# Patient Record
Sex: MALE | Race: WHITE | NOT HISPANIC OR LATINO | Employment: OTHER | URBAN - METROPOLITAN AREA
[De-identification: names, ages, dates, MRNs, and addresses within clinical notes are randomized per-mention and may not be internally consistent; named-entity substitution may affect disease eponyms.]

---

## 2018-03-30 ENCOUNTER — OFFICE VISIT CONVERTED (OUTPATIENT)
Dept: CARDIOLOGY | Facility: CLINIC | Age: 58
End: 2018-03-30
Attending: SPECIALIST

## 2018-11-06 ENCOUNTER — CONVERSION ENCOUNTER (OUTPATIENT)
Dept: OTHER | Facility: HOSPITAL | Age: 58
End: 2018-11-06

## 2018-11-06 ENCOUNTER — OFFICE VISIT CONVERTED (OUTPATIENT)
Dept: CARDIOLOGY | Facility: CLINIC | Age: 58
End: 2018-11-06
Attending: SPECIALIST

## 2020-10-20 ENCOUNTER — OFFICE VISIT CONVERTED (OUTPATIENT)
Dept: CARDIOLOGY | Facility: CLINIC | Age: 60
End: 2020-10-20
Attending: SPECIALIST

## 2021-05-13 NOTE — PROGRESS NOTES
"   Progress Note      Patient Name: Marquis Pool   Patient ID: [id number]   Sex: Male   YOB: 1960    Primary Care Provider: No PCP No PCP Other   Referring Provider: Nikhil Corado MD    Visit Date: October 20, 2020    Provider: Nikhil Corado MD   Location: [facility]   Location Address: [address] [place] [address], [facility]   [place]  [id number]   Location Phone: [phone]          Chief Complaint  · Coronary artery disease, s/p PTCA/stent       History Of Present Illness  Marquis Pool is a 60 year old /White male with a history of coronary artery disease, s/p PTCA/stent. No chest pain or shortness of breath. He has not seen me for the last two years.   CURRENT MEDICATIONS: include Metoprolol 50 mg 1/2 tablet b.i.d.; ASA 81 mg 2 tablets daily; Rosuvastatin 20 mg daily; Prasugrel 10 mg daily; Amlodipine 5 mg daily; Omeprazole 20 mg daily; Vitamin C 500 mg daily. The dosage and frequency of the medications were reviewed with the patient.   PAST MEDICAL HISTORY: Coronary artery disease, s/p PTCA/stent; hypertension.   PSYCHOSOCIAL HISTORY: He rarely drinks alcohol. He smokes 1 pack of cigarettes per day.       Review of Systems  · Cardiovascular  o Admits  o : shortness of breath while walking or lying flat  o Denies  o : palpitations (fast, fluttering, or skipping beats), swelling (feet, ankles, hands), chest pain or angina pectoris   · Respiratory  o Admits  o : chronic or frequent cough      Vitals  Date Time BP Position Site L\R Cuff Size HR RR TEMP (F) WT  HT  BMI kg/m2 BSA m2 O2 Sat FR L/min FiO2        10/20/2020 09:37 /70 Sitting    54 - R   228lbs 0oz 5'  9\" 33.67 2.24             Physical Examination  · Constitutional  o Appearance  o : Awake, alert, cooperative, pleasant.  · Respiratory  o Inspection of Chest  o : No chest wall deformities, moving equal.  o Auscultation of Lungs  o : Good air entry with vesicular breath sounds.  · Cardiovascular  o Heart  o : "   § Auscultation of Heart  § : S1 and S2 regular. No S3. No S4. No murmurs.  o Peripheral Vascular System  o :   § Extremities  § : Peripheral pulses were well felt. No edema. No cyanosis.  · Gastrointestinal  o Abdominal Examination  o : No masses or organomegaly noted.          Assessment     ASSESSMENT AND PLAN:    1.  Coronary artery disease, s/p PTCA/stent, stable:  Continue ASA, Effient and Metoprolol.  2.  Essential hypertension controlled:  Continue Amlodipine.  3.  Hyperlipidemia:  Continue current dose of Rosuvastatin, managed by his PMD.  4.  Repeat stress test next year in view of his coronary artery disease, on his next visit.  5.  Positive for nicotine use:  Smoking-cessation instructions were discussed with the patient.  6.  See me back in 6 months.    Nikhil Corado MD, [facility]  DIANN/james           This note was transcribed by Ayesha Palumbo.  james/DIANN  The above service was transcribed by Ayesha Palumbo, and I attest to the accuracy of the note.  DIANN               Electronically Signed by: Ayesha Palumbo-, -Author on October 22, 2020 08:38:06 AM  Electronically Co-signed by: Nikhil Corado MD -Reviewer on October 26, 2020 08:36:40 AM

## 2021-05-14 VITALS
WEIGHT: 228 LBS | BODY MASS INDEX: 33.77 KG/M2 | HEART RATE: 54 BPM | SYSTOLIC BLOOD PRESSURE: 128 MMHG | HEIGHT: 69 IN | DIASTOLIC BLOOD PRESSURE: 70 MMHG

## 2021-05-16 VITALS
SYSTOLIC BLOOD PRESSURE: 134 MMHG | HEIGHT: 69 IN | WEIGHT: 239 LBS | BODY MASS INDEX: 35.4 KG/M2 | HEART RATE: 58 BPM | DIASTOLIC BLOOD PRESSURE: 92 MMHG

## 2021-05-16 VITALS
WEIGHT: 235 LBS | BODY MASS INDEX: 34.8 KG/M2 | DIASTOLIC BLOOD PRESSURE: 63 MMHG | SYSTOLIC BLOOD PRESSURE: 110 MMHG | HEART RATE: 60 BPM | HEIGHT: 69 IN

## 2021-05-22 ENCOUNTER — TRANSCRIBE ORDERS (OUTPATIENT)
Dept: CARDIOLOGY | Facility: CLINIC | Age: 61
End: 2021-05-22

## 2021-05-22 DIAGNOSIS — I25.10 CORONARY ARTERY DISEASE INVOLVING NATIVE CORONARY ARTERY, ANGINA PRESENCE UNSPECIFIED, UNSPECIFIED WHETHER NATIVE OR TRANSPLANTED HEART: Primary | ICD-10-CM

## 2021-07-20 ENCOUNTER — OFFICE VISIT (OUTPATIENT)
Dept: CARDIOLOGY | Facility: CLINIC | Age: 61
End: 2021-07-20

## 2021-07-20 VITALS
WEIGHT: 233 LBS | HEIGHT: 69 IN | HEART RATE: 66 BPM | SYSTOLIC BLOOD PRESSURE: 136 MMHG | BODY MASS INDEX: 34.51 KG/M2 | DIASTOLIC BLOOD PRESSURE: 72 MMHG

## 2021-07-20 DIAGNOSIS — Z72.0 NICOTINE USE: ICD-10-CM

## 2021-07-20 DIAGNOSIS — I25.10 CORONARY ARTERY DISEASE INVOLVING NATIVE CORONARY ARTERY OF NATIVE HEART WITHOUT ANGINA PECTORIS: Primary | ICD-10-CM

## 2021-07-20 DIAGNOSIS — E78.2 HYPERLIPEMIA, MIXED: ICD-10-CM

## 2021-07-20 DIAGNOSIS — Z95.5 HISTORY OF CORONARY ANGIOPLASTY WITH INSERTION OF STENT: ICD-10-CM

## 2021-07-20 PROCEDURE — 99214 OFFICE O/P EST MOD 30 MIN: CPT | Performed by: SPECIALIST

## 2021-07-20 RX ORDER — AMLODIPINE BESYLATE 5 MG/1
5 TABLET ORAL DAILY
Qty: 90 TABLET | Refills: 1 | Status: SHIPPED | OUTPATIENT
Start: 2021-07-20 | End: 2022-05-23

## 2021-07-20 RX ORDER — AMLODIPINE BESYLATE 5 MG/1
5 TABLET ORAL DAILY
COMMUNITY
End: 2021-07-20 | Stop reason: SDUPTHER

## 2021-07-20 RX ORDER — METOPROLOL TARTRATE 50 MG/1
50 TABLET, FILM COATED ORAL 2 TIMES DAILY
COMMUNITY
End: 2021-07-20 | Stop reason: SDUPTHER

## 2021-07-20 RX ORDER — PRASUGREL 10 MG/1
10 TABLET, FILM COATED ORAL DAILY
Qty: 90 TABLET | Refills: 3 | Status: SHIPPED | OUTPATIENT
Start: 2021-07-20 | End: 2022-08-31

## 2021-07-20 RX ORDER — ROSUVASTATIN CALCIUM 20 MG/1
20 TABLET, COATED ORAL NIGHTLY
COMMUNITY
End: 2022-12-08 | Stop reason: SDUPTHER

## 2021-07-20 RX ORDER — METOPROLOL TARTRATE 50 MG/1
TABLET, FILM COATED ORAL
Qty: 90 TABLET | Refills: 3 | Status: SHIPPED | OUTPATIENT
Start: 2021-07-20 | End: 2022-12-08 | Stop reason: SDUPTHER

## 2021-07-20 RX ORDER — PRASUGREL 10 MG/1
10 TABLET, FILM COATED ORAL DAILY
COMMUNITY
End: 2021-07-20 | Stop reason: SDUPTHER

## 2021-07-20 NOTE — PROGRESS NOTES
[facility]  Cardiology progress Note    Patient Name: Marquis Pool  : 1960    CHIEF COMPLAINT  Coronary artery disease, history of PTCA/stent.      Subjective   Subjective     HISTORY OF PRESENT ILLNESS    Marquis Pool is a 61 y.o. male with history of coronary disease, s/p PTCA/stent.  Denies any chest pain or shortness of breath.    Review of Systems:   Constitutional no fever,  no weight loss   Skin no rash   Otolaryngeal no difficulty swallowing   Cardiovascular See HPI   Pulmonary no cough, no sputum production   Gastrointestinal no constipation, no diarrhea   Genitourinary no dysuria, no hematuria   Hematologic no easy bruisability, no abnormal bleeding   Musculoskeletal no muscle pain   Neurologic no dizziness, no falls         Personal History     Social History:  reports that he has been smoking. He has never used smokeless tobacco. Drug use questions deferred to the physician. He reports that he does not drink alcohol.    Home Medications:  Current Outpatient Medications on File Prior to Visit   Medication Sig   • rosuvastatin (CRESTOR) 20 MG tablet Take 20 mg by mouth Every Night.   • [DISCONTINUED] amLODIPine (NORVASC) 5 MG tablet Take 5 mg by mouth Daily.   • [DISCONTINUED] metoprolol tartrate (LOPRESSOR) 50 MG tablet Take 50 mg by mouth 2 (Two) Times a Day.   • [DISCONTINUED] prasugrel (EFFIENT) 10 MG tablet Take 10 mg by mouth Daily.     No current facility-administered medications on file prior to visit.     Allergies:  No Known Allergies    Objective    Objective       Vitals:   Heart Rate:  [66-71] 66  BP: (136-155)/(72-91) 136/72  Body mass index is 34.41 kg/m².     Physical Exam:   Constitutional: Awake, alert, No acute distress    Eyes: PERRLA, sclerae anicteric, no conjunctival injection   HENT: NCAT, mucous membranes moist   Neck: Supple, no thyromegaly, no lymphadenopathy, trachea midline   Respiratory: Clear to auscultation bilaterally, nonlabored respirations     Cardiovascular: RRR, no murmurs, rubs, or gallops, palpable pedal pulses bilaterally   Gastrointestinal: Positive bowel sounds, soft, nontender, nondistended   Musculoskeletal: No bilateral ankle edema, no clubbing or cyanosis to extremities   Psychiatric: Appropriate affect, cooperative   Neurologic: Oriented x 3, strength symmetric in all extremities, Cranial Nerves grossly intact to confrontation, speech clear   Skin: No rashes.    Result Review    Result Review:  I have personally reviewed the available results from  [x]  Laboratory  [x]  EKG  [x]  Cardiology  [x]  Medications  [x]  Old records  []  Other:   Procedures      Impression/Plan:  1 Coronary disease s/p PTCA/stent: Exercise treadmill stress test done today was negative for any ischemia.  Continue aspirin, Effient and metoprolol.  2.  Hyperlipidemia: Continue Crestor 20 mg once a day.  Low-fat diet advised.  Lipid and hepatic profile next visit.  3.  Essential hypertension controlled: Continue amlodipine 5 mg once a day.  Low-salt diet advised.  4.  Positive for nicotine use: Smoking cessation discussed the patient.  5.  Obesity: Low-fat diet, regular exercise advised.        Electronically signed by Nikhil Corado MD, 07/20/21, 12:09 PM EDT.

## 2022-01-28 ENCOUNTER — TELEPHONE (OUTPATIENT)
Dept: CARDIOLOGY | Facility: CLINIC | Age: 62
End: 2022-01-28

## 2022-03-16 ENCOUNTER — APPOINTMENT (OUTPATIENT)
Dept: GENERAL RADIOLOGY | Facility: HOSPITAL | Age: 62
End: 2022-03-16

## 2022-03-16 ENCOUNTER — APPOINTMENT (OUTPATIENT)
Dept: MRI IMAGING | Facility: HOSPITAL | Age: 62
End: 2022-03-16

## 2022-03-16 ENCOUNTER — APPOINTMENT (OUTPATIENT)
Dept: CT IMAGING | Facility: HOSPITAL | Age: 62
End: 2022-03-16

## 2022-03-16 ENCOUNTER — HOSPITAL ENCOUNTER (EMERGENCY)
Facility: HOSPITAL | Age: 62
Discharge: HOME | End: 2022-03-16
Attending: EMERGENCY MEDICINE | Admitting: EMERGENCY MEDICINE

## 2022-03-16 VITALS
RESPIRATION RATE: 18 BRPM | DIASTOLIC BLOOD PRESSURE: 72 MMHG | OXYGEN SATURATION: 94 % | WEIGHT: 225.53 LBS | SYSTOLIC BLOOD PRESSURE: 130 MMHG | HEART RATE: 55 BPM | HEIGHT: 69 IN | TEMPERATURE: 98.1 F | BODY MASS INDEX: 33.4 KG/M2

## 2022-03-16 DIAGNOSIS — I63.9 CEREBROVASCULAR ACCIDENT (CVA), UNSPECIFIED MECHANISM: Primary | ICD-10-CM

## 2022-03-16 LAB
ABO GROUP BLD: NORMAL
ALBUMIN SERPL-MCNC: 4 G/DL (ref 3.5–5.2)
ALBUMIN/GLOB SERPL: 1.4 G/DL
ALP SERPL-CCNC: 90 U/L (ref 39–117)
ALT SERPL W P-5'-P-CCNC: 15 U/L (ref 1–41)
ANION GAP SERPL CALCULATED.3IONS-SCNC: 11.1 MMOL/L (ref 5–15)
AST SERPL-CCNC: 13 U/L (ref 1–40)
BASOPHILS # BLD AUTO: 0.08 10*3/MM3 (ref 0–0.2)
BASOPHILS NFR BLD AUTO: 0.8 % (ref 0–1.5)
BILIRUB SERPL-MCNC: 0.6 MG/DL (ref 0–1.2)
BLD GP AB SCN SERPL QL: NEGATIVE
BUN SERPL-MCNC: 12 MG/DL (ref 8–23)
BUN/CREAT SERPL: 12.8 (ref 7–25)
CALCIUM SPEC-SCNC: 8.8 MG/DL (ref 8.6–10.5)
CHLORIDE SERPL-SCNC: 106 MMOL/L (ref 98–107)
CO2 SERPL-SCNC: 23.9 MMOL/L (ref 22–29)
CREAT SERPL-MCNC: 0.94 MG/DL (ref 0.76–1.27)
DEPRECATED RDW RBC AUTO: 47.1 FL (ref 37–54)
EGFRCR SERPLBLD CKD-EPI 2021: 91.1 ML/MIN/1.73
EOSINOPHIL # BLD AUTO: 0.46 10*3/MM3 (ref 0–0.4)
EOSINOPHIL NFR BLD AUTO: 4.6 % (ref 0.3–6.2)
ERYTHROCYTE [DISTWIDTH] IN BLOOD BY AUTOMATED COUNT: 14 % (ref 12.3–15.4)
GLOBULIN UR ELPH-MCNC: 2.8 GM/DL
GLUCOSE SERPL-MCNC: 126 MG/DL (ref 65–99)
HCT VFR BLD AUTO: 43.8 % (ref 37.5–51)
HGB BLD-MCNC: 14.7 G/DL (ref 13–17.7)
HOLD SPECIMEN: NORMAL
HOLD SPECIMEN: NORMAL
IMM GRANULOCYTES # BLD AUTO: 0.02 10*3/MM3 (ref 0–0.05)
IMM GRANULOCYTES NFR BLD AUTO: 0.2 % (ref 0–0.5)
INR PPP: 0.95 (ref 2–3)
LYMPHOCYTES # BLD AUTO: 1.9 10*3/MM3 (ref 0.7–3.1)
LYMPHOCYTES NFR BLD AUTO: 19.1 % (ref 19.6–45.3)
MCH RBC QN AUTO: 30.6 PG (ref 26.6–33)
MCHC RBC AUTO-ENTMCNC: 33.6 G/DL (ref 31.5–35.7)
MCV RBC AUTO: 91.3 FL (ref 79–97)
MONOCYTES # BLD AUTO: 0.78 10*3/MM3 (ref 0.1–0.9)
MONOCYTES NFR BLD AUTO: 7.8 % (ref 5–12)
NEUTROPHILS NFR BLD AUTO: 6.73 10*3/MM3 (ref 1.7–7)
NEUTROPHILS NFR BLD AUTO: 67.5 % (ref 42.7–76)
NRBC BLD AUTO-RTO: 0 /100 WBC (ref 0–0.2)
PLATELET # BLD AUTO: 328 10*3/MM3 (ref 140–450)
PMV BLD AUTO: 10.4 FL (ref 6–12)
POTASSIUM SERPL-SCNC: 3.9 MMOL/L (ref 3.5–5.2)
PROT SERPL-MCNC: 6.8 G/DL (ref 6–8.5)
PROTHROMBIN TIME: 10.1 SECONDS (ref 9.4–12)
QT INTERVAL: 425 MS
RBC # BLD AUTO: 4.8 10*6/MM3 (ref 4.14–5.8)
RH BLD: POSITIVE
SODIUM SERPL-SCNC: 141 MMOL/L (ref 136–145)
T&S EXPIRATION DATE: NORMAL
WBC NRBC COR # BLD: 9.97 10*3/MM3 (ref 3.4–10.8)
WHOLE BLOOD HOLD SPECIMEN: NORMAL
WHOLE BLOOD HOLD SPECIMEN: NORMAL

## 2022-03-16 PROCEDURE — 36415 COLL VENOUS BLD VENIPUNCTURE: CPT

## 2022-03-16 PROCEDURE — 70553 MRI BRAIN STEM W/O & W/DYE: CPT

## 2022-03-16 PROCEDURE — A9577 INJ MULTIHANCE: HCPCS | Performed by: EMERGENCY MEDICINE

## 2022-03-16 PROCEDURE — 71045 X-RAY EXAM CHEST 1 VIEW: CPT

## 2022-03-16 PROCEDURE — 85025 COMPLETE CBC W/AUTO DIFF WBC: CPT | Performed by: EMERGENCY MEDICINE

## 2022-03-16 PROCEDURE — 93005 ELECTROCARDIOGRAM TRACING: CPT

## 2022-03-16 PROCEDURE — 96374 THER/PROPH/DIAG INJ IV PUSH: CPT

## 2022-03-16 PROCEDURE — 70548 MR ANGIOGRAPHY NECK W/DYE: CPT

## 2022-03-16 PROCEDURE — 70450 CT HEAD/BRAIN W/O DYE: CPT

## 2022-03-16 PROCEDURE — 85610 PROTHROMBIN TIME: CPT | Performed by: EMERGENCY MEDICINE

## 2022-03-16 PROCEDURE — 86900 BLOOD TYPING SEROLOGIC ABO: CPT | Performed by: EMERGENCY MEDICINE

## 2022-03-16 PROCEDURE — 86901 BLOOD TYPING SEROLOGIC RH(D): CPT | Performed by: EMERGENCY MEDICINE

## 2022-03-16 PROCEDURE — 99283 EMERGENCY DEPT VISIT LOW MDM: CPT

## 2022-03-16 PROCEDURE — 25010000002 LORAZEPAM PER 2 MG: Performed by: EMERGENCY MEDICINE

## 2022-03-16 PROCEDURE — 86850 RBC ANTIBODY SCREEN: CPT | Performed by: EMERGENCY MEDICINE

## 2022-03-16 PROCEDURE — 0 GADOBENATE DIMEGLUMINE 529 MG/ML SOLUTION: Performed by: EMERGENCY MEDICINE

## 2022-03-16 PROCEDURE — 93005 ELECTROCARDIOGRAM TRACING: CPT | Performed by: EMERGENCY MEDICINE

## 2022-03-16 PROCEDURE — 70544 MR ANGIOGRAPHY HEAD W/O DYE: CPT

## 2022-03-16 PROCEDURE — 80053 COMPREHEN METABOLIC PANEL: CPT | Performed by: EMERGENCY MEDICINE

## 2022-03-16 RX ORDER — SODIUM CHLORIDE 0.9 % (FLUSH) 0.9 %
10 SYRINGE (ML) INJECTION AS NEEDED
Status: DISCONTINUED | OUTPATIENT
Start: 2022-03-16 | End: 2022-03-16

## 2022-03-16 RX ORDER — SODIUM CHLORIDE 0.9 % (FLUSH) 0.9 %
10 SYRINGE (ML) INJECTION AS NEEDED
Status: DISCONTINUED | OUTPATIENT
Start: 2022-03-16 | End: 2022-03-16 | Stop reason: HOSPADM

## 2022-03-16 RX ORDER — CLOPIDOGREL BISULFATE 75 MG/1
75 TABLET ORAL DAILY
Qty: 30 TABLET | Refills: 0 | Status: SHIPPED | OUTPATIENT
Start: 2022-03-16 | End: 2022-12-13

## 2022-03-16 RX ORDER — LORAZEPAM 2 MG/ML
1 INJECTION INTRAMUSCULAR ONCE
Status: COMPLETED | OUTPATIENT
Start: 2022-03-16 | End: 2022-03-16

## 2022-03-16 RX ADMIN — LORAZEPAM 1 MG: 2 INJECTION INTRAMUSCULAR; INTRAVENOUS at 16:20

## 2022-03-16 RX ADMIN — GADOBENATE DIMEGLUMINE 20 ML: 529 INJECTION, SOLUTION INTRAVENOUS at 17:35

## 2022-03-16 NOTE — ED PROVIDER NOTES
Time: 1:08 PM EDT  Arrived by: private car; accompanied by spouse   Chief Complaint: WEAKNESS and DIFFICULTY WITH SPEECH   History provided by: pt  History is limited by: N/A     History of Present Illness:  Patient is a 62 y.o. year old male that presents to the emergency department with WEAKNESS and DIFFICULTY WITH SPEECH. This started two days ago and is still present, but improved. Symptoms have been constant and are moderate in severity. Nothing improves or worsens the symptoms. The weakness is located to the RUE and the difficulty with speech is described as slurred speech. Pt has not been evaluated for symptoms prior to today's visit. He denies change in vision. No weakness to the RLE.     Pt has hx of TIA and MI. Pt takes 81 mg of aspirin daily.     History provided by:  Patient  Cerebrovascular Accident  The primary symptoms include focal weakness (RUE) and speech change. Primary symptoms do not include headaches, visual change, fever, nausea or vomiting. Loss of sensation: slurred speech. The symptoms began 2 days ago.   The symptoms are improving.   Weakness began greater than 24 hours ago. The weakness is improving.   Additional symptoms include weakness (RUE). Additional symptoms do not include photophobia.     Similar Symptoms Previously: no  Recently seen: not recently seen in this ED     Patient Care Team  Primary Care Provider: Provider, No Known  Cardiologist - [place]     Past Medical History:     No Known Allergies  Past Medical History:   Diagnosis Date   • Coronary artery disease    • Hypertension    • Myocardial infarction (HCC)    • TIA (transient ischemic attack)      Past Surgical History:   Procedure Laterality Date   • CAROTID STENT       Family History   Problem Relation Age of Onset   • Heart attack Mother        Home Medications:  Prior to Admission medications    Medication Sig Start Date End Date Taking? Authorizing Provider   amLODIPine (NORVASC) 5 MG tablet Take 1 tablet by mouth  "Daily. 7/20/21   Nikhil Corado MD   metoprolol tartrate (LOPRESSOR) 50 MG tablet Take 1/2 tablet by mouth twice daily. 7/20/21   Nikhil Corado MD   prasugrel (EFFIENT) 10 MG tablet Take 1 tablet by mouth Daily. 7/20/21   Nikhil Corado MD   rosuvastatin (CRESTOR) 20 MG tablet Take 20 mg by mouth Every Night.    Provider, Alesia, MD        Social History:   Social History     Tobacco Use   • Smoking status: Current Every Day Smoker   • Smokeless tobacco: Never Used   Vaping Use   • Vaping Use: Never used   Substance Use Topics   • Alcohol use: Never   • Drug use: Defer       Review of Systems:  Review of Systems   Constitutional: Negative for chills, diaphoresis and fever.   HENT: Negative for ear discharge and nosebleeds.    Eyes: Negative for photophobia.   Respiratory: Negative for shortness of breath.    Cardiovascular: Negative for chest pain.   Gastrointestinal: Negative for diarrhea, nausea and vomiting.   Genitourinary: Negative for dysuria.   Musculoskeletal: Negative for back pain and neck pain.   Skin: Negative for rash.   Neurological: Positive for speech change, focal weakness (RUE), speech difficulty and weakness (RUE). Negative for headaches.        Physical Exam:  /72   Pulse 55   Temp 98.1 °F (36.7 °C) (Oral)   Resp 18   Ht 175.3 cm (69\")   Wt 102 kg (225 lb 8.5 oz)   SpO2 94%   BMI 33.31 kg/m²     Physical Exam  Vitals and nursing note reviewed.   Constitutional:       General: He is not in acute distress.     Appearance: Normal appearance.   HENT:      Head: Normocephalic and atraumatic.      Nose: Nose normal.   Eyes:      General: No scleral icterus.  Cardiovascular:      Rate and Rhythm: Normal rate and regular rhythm.      Heart sounds: Normal heart sounds.   Pulmonary:      Effort: Pulmonary effort is normal. No respiratory distress.      Breath sounds: Normal breath sounds.   Abdominal:      Palpations: Abdomen is soft.      Tenderness: There is no " abdominal tenderness.   Musculoskeletal:         General: Normal range of motion.      Cervical back: Neck supple.      Right lower leg: No edema.      Left lower leg: No edema.   Skin:     General: Skin is warm and dry.   Neurological:      General: No focal deficit present.      Mental Status: He is alert and oriented to person, place, and time.      Cranial Nerves: Dysarthria present. No facial asymmetry.      Sensory: Sensory deficit (numbness to rigth arm) present.      Motor: Weakness (right arm) present.      Comments: NIH Stroke Scale: 3                 Medications in the Emergency Department:  Medications   LORazepam (ATIVAN) injection 1 mg (1 mg Intravenous Given 3/16/22 1620)   gadobenate dimeglumine (MULTIHANCE) injection 20 mL (20 mL Intravenous Given 3/16/22 3621)        Labs  Lab Results (last 24 hours)     ** No results found for the last 24 hours. **           Imaging:  No Radiology Exams Resulted Within Past 24 Hours    Procedures:  Procedures    Progress                            Medical Decision Making:  MDM   62-year-old male patient presents 2 days after having onset of slurred speech and right upper extremity weakness.  His symptoms have slightly improved since initial onset.  Patient is outside the window is for any interventional treatment.  Initial head CT did not show acute findings.  Subsequent MRI angiogram of the head and neck and MRI brain with and without contrast did not show any acute findings.  Patient was discussed with the on-call neurologist,, Dr. Patel, who feels the patient can safely be discharged home with office follow-up.  The patient is stable for discharge with outpatient neurology follow-up.  Final diagnoses:   Cerebrovascular accident (CVA), unspecified mechanism (HCC)        Disposition:  ED Disposition     ED Disposition   Discharge    Condition   Stable    Comment   --             Documentation assistance provided by Tammy Aguilar acting as scribe for Faraz  DO Lissa. Information recorded by the scribe was done at my direction and has been verified and validated by me.         Tammy Aguilar  03/16/22 1315       Faraz Alcaraz DO  03/20/22 1949

## 2022-03-17 ENCOUNTER — TELEPHONE (OUTPATIENT)
Dept: CARDIOLOGY | Facility: CLINIC | Age: 62
End: 2022-03-17

## 2022-03-17 NOTE — TELEPHONE ENCOUNTER
Received VM from wife stating patient was seen in ER for possible strokes and was started on plavix. Wife wanting to know if it was ok to take. Advised can take plavix.

## 2022-03-21 PROBLEM — Z98.61 CAD S/P PERCUTANEOUS CORONARY ANGIOPLASTY: Status: ACTIVE | Noted: 2022-03-21

## 2022-03-21 PROBLEM — E78.5 HYPERLIPIDEMIA LDL GOAL <70: Status: ACTIVE | Noted: 2022-03-21

## 2022-03-21 PROBLEM — I25.10 CAD S/P PERCUTANEOUS CORONARY ANGIOPLASTY: Status: ACTIVE | Noted: 2022-03-21

## 2022-03-21 PROBLEM — I63.9 CVA (CEREBROVASCULAR ACCIDENT): Status: ACTIVE | Noted: 2022-03-21

## 2022-03-21 PROBLEM — I10 ESSENTIAL HYPERTENSION: Status: ACTIVE | Noted: 2022-03-21

## 2022-03-21 PROBLEM — F17.200 SMOKER: Status: ACTIVE | Noted: 2022-03-21

## 2022-03-22 ENCOUNTER — OFFICE VISIT (OUTPATIENT)
Dept: CARDIOLOGY | Facility: CLINIC | Age: 62
End: 2022-03-22

## 2022-03-22 VITALS
OXYGEN SATURATION: 98 % | SYSTOLIC BLOOD PRESSURE: 128 MMHG | HEIGHT: 69 IN | BODY MASS INDEX: 32.67 KG/M2 | WEIGHT: 220.6 LBS | HEART RATE: 103 BPM | DIASTOLIC BLOOD PRESSURE: 86 MMHG

## 2022-03-22 DIAGNOSIS — I63.9 CEREBROVASCULAR ACCIDENT (CVA), UNSPECIFIED MECHANISM: Primary | ICD-10-CM

## 2022-03-22 DIAGNOSIS — F17.200 SMOKER: ICD-10-CM

## 2022-03-22 DIAGNOSIS — I65.21 CAROTID STENOSIS, ASYMPTOMATIC, RIGHT: ICD-10-CM

## 2022-03-22 PROCEDURE — 99214 OFFICE O/P EST MOD 30 MIN: CPT | Performed by: NURSE PRACTITIONER

## 2022-03-22 RX ORDER — BUPROPION HYDROCHLORIDE 150 MG/1
150 TABLET, EXTENDED RELEASE ORAL 2 TIMES DAILY
Qty: 180 TABLET | Refills: 1 | Status: SHIPPED | OUTPATIENT
Start: 2022-03-22 | End: 2022-12-13

## 2022-03-22 RX ORDER — ASPIRIN 81 MG/1
81 TABLET, CHEWABLE ORAL DAILY
COMMUNITY
End: 2022-12-13 | Stop reason: SDUPTHER

## 2022-03-22 NOTE — PROGRESS NOTES
Chief Complaint  Follow-up ([facility] 3/16 - EKG in chart )    Subjective            History of Present Illness  Marquis Pool is a 62-year-old white/ male patient who presents to the office today for ER follow-up.  He presented to [facility] emergency room on 3/16/2022 for symptoms of weakness and difficulty with speech.  He was diagnosed with CVA, was administered Ativan and had CT head, MRI brain, MRA brain, and MRA of neck performed.  MRA of neck did show mild stenosis of right carotid.  There were no acute findings, neurology was consulted, and patient was discharged to home with orders to follow-up outpatient.  He was prescribed 30 days worth of Plavix 75 mg daily upon discharge from the ER.  Today he reports that he still has occasional tremor of right upper extremity and intermittent limited range of motion with the right upper extremity as well.  He reports compliance with all of his medications.  He denies any chest pain, shortness of breath, lightheadedness/dizziness, palpitations, or edema.    [facility]  Past Medical History:   Diagnosis Date   • CAD S/P percutaneous coronary angioplasty 03/21/2022   • CVA 03/21/2022   • Essential hypertension 03/21/2022   • Hyperlipidemia LDL goal <70 03/21/2022   • Myocardial infarction (HCC)    • Smoker 03/21/2022   • TIA (transient ischemic attack)          ALLERGY  No Known Allergies       SURGICALHX  Past Surgical History:   Procedure Laterality Date   • CAROTID STENT            SOC  Social History     Socioeconomic History   • Marital status:    Tobacco Use   • Smoking status: Current Every Day Smoker   • Smokeless tobacco: Never Used   Vaping Use   • Vaping Use: Never used   Substance and Sexual Activity   • Alcohol use: Never   • Drug use: Defer   • Sexual activity: Defer         FAMHX  Family History   Problem Relation Age of Onset   • Heart attack Mother           MEDSIGONLY  Current Outpatient Medications on File Prior to Visit   Medication Sig  "  • amLODIPine (NORVASC) 5 MG tablet Take 1 tablet by mouth Daily.   • aspirin 81 MG chewable tablet Chew 81 mg Daily.   • clopidogrel (Plavix) 75 MG tablet Take 1 tablet by mouth Daily.   • metoprolol tartrate (LOPRESSOR) 50 MG tablet Take 1/2 tablet by mouth twice daily.   • prasugrel (EFFIENT) 10 MG tablet Take 1 tablet by mouth Daily.   • rosuvastatin (CRESTOR) 20 MG tablet Take 20 mg by mouth Every Night.     No current facility-administered medications on file prior to visit.         Objective   /86   Pulse 103   Ht 175.3 cm (69\")   Wt 100 kg (220 lb 9.6 oz)   SpO2 98%   BMI 32.58 kg/m²       Physical Exam  Constitutional:       Appearance: He is obese.   HENT:      Head: Normocephalic.   Cardiovascular:      Rate and Rhythm: Normal rate and regular rhythm.      Pulses: Normal pulses.      Heart sounds: Normal heart sounds. No murmur heard.  Pulmonary:      Effort: Pulmonary effort is normal.      Breath sounds: Normal breath sounds.   Musculoskeletal:      Cervical back: Neck supple.      Right lower leg: No edema.      Left lower leg: No edema.   Skin:     General: Skin is dry.      Capillary Refill: Capillary refill takes less than 2 seconds.   Neurological:      Mental Status: He is alert and oriented to person, place, and time.   Psychiatric:         Behavior: Behavior normal.       Result Review :   The following data was reviewed by: LONDON Montero on 03/22/2022:  No results found for: PROBNP  CMP    CMP 3/16/22   Glucose 126 (A)   BUN 12   Creatinine 0.94   Sodium 141   Potassium 3.9   Chloride 106   Calcium 8.8   Albumin 4.00   Total Bilirubin 0.6   Alkaline Phosphatase 90   AST (SGOT) 13   ALT (SGPT) 15   (A) Abnormal value            CBC w/diff    CBC w/Diff 3/16/22   WBC 9.97   RBC 4.80   Hemoglobin 14.7   Hematocrit 43.8   MCV 91.3   MCH 30.6   MCHC 33.6   RDW 14.0   Platelets 328   Neutrophil Rel % 67.5   Immature Granulocyte Rel % 0.2   Lymphocyte Rel % 19.1 (A)   Monocyte Rel " % 7.8   Eosinophil Rel % 4.6   Basophil Rel % 0.8   (A) Abnormal value             No results found for: TSH   No results found for: FREET4   No results found for: DDIMERQUANT  No results found for: MG   No results found for: DIGOXIN   No results found for: TROPONINT               Assessment and Plan    Diagnoses and all orders for this visit:    1. Cerebrovascular accident (CVA) (Primary)  Patient reports that he has not made follow-up with neurology at this time but is willing to see Dr. Suarez for consultation.  He has been taking the Plavix 75 mg daily.  I had discussed this with Dr. Corado since the patient is already taking Effient and aspirin.  It was recommended by Dr. Corado to stop the Plavix due to increased risk of bleed.    -     Ambulatory Referral to Neurology    2. Carotid stenosis, right  MRA of neck shows mild right carotid stenosis, will obtain CTA carotid for further evaluation.  -     CT Angiogram Carotids; Future    3. Smoker  Tobacco abuse cessation counseling provided to patient today and was able to get patient to commit to cessation plan with aid of Wellbutrin.    Other orders  -     buPROPion SR (Wellbutrin SR) 150 MG 12 hr tablet; Take 1 tablet by mouth 2 (Two) Times a Day.  Dispense: 180 tablet; Refill: 1            Follow Up   Return in about 6 months (around 9/22/2022) for Follow up with Dr Corado.    Patient was given instructions and counseling regarding his condition or for health maintenance advice. Please see specific information pulled into the AVS if appropriate.     Marquis Pool  reports that he has been smoking. He has never used smokeless tobacco.. I have educated him on the risk of diseases from using tobacco products such as cancer, COPD and heart disease.     I advised him to quit and he is willing to quit. We have discussed the following method/s for tobacco cessation:  Prescription Medicaiton: Wellbutrin.  Together we have set a quit date for 2 months.  He will  follow up with me in 6 months  or sooner to check on his progress.    I spent 5 minutes counseling the patient.         Kelley Hopper, APRN  03/22/22  14:53 EDT    Dictated Utilizing Dragon Dictation

## 2022-04-06 ENCOUNTER — TELEPHONE (OUTPATIENT)
Dept: CARDIOLOGY | Facility: CLINIC | Age: 62
End: 2022-04-06

## 2022-04-06 DIAGNOSIS — I63.9 CEREBROVASCULAR ACCIDENT (CVA), UNSPECIFIED MECHANISM: Primary | ICD-10-CM

## 2022-04-06 NOTE — TELEPHONE ENCOUNTER
Spoke with Dr Suarez: He is wanting 24 hour holter monitor and echocardiogram to be done in our office and results faxed to him. Please call his office for orders to be faxed to us.

## 2022-04-06 NOTE — TELEPHONE ENCOUNTER
Received call from Dr Suarez's office. Dr Suarez wanting patient to have stress test and echo.     Please advise

## 2022-04-15 ENCOUNTER — HOSPITAL ENCOUNTER (OUTPATIENT)
Dept: CT IMAGING | Facility: HOSPITAL | Age: 62
Discharge: HOME | End: 2022-04-15
Admitting: NURSE PRACTITIONER

## 2022-04-15 DIAGNOSIS — I65.21 CAROTID STENOSIS, ASYMPTOMATIC, RIGHT: ICD-10-CM

## 2022-04-15 PROCEDURE — 0 IOPAMIDOL PER 1 ML: Performed by: NURSE PRACTITIONER

## 2022-04-15 PROCEDURE — 70498 CT ANGIOGRAPHY NECK: CPT

## 2022-04-15 RX ADMIN — IOPAMIDOL 100 ML: 755 INJECTION, SOLUTION INTRAVENOUS at 15:40

## 2022-04-18 ENCOUNTER — TELEPHONE (OUTPATIENT)
Dept: CARDIOLOGY | Facility: CLINIC | Age: 62
End: 2022-04-18

## 2022-04-18 DIAGNOSIS — R91.1 PULMONARY NODULE: Primary | ICD-10-CM

## 2022-04-18 NOTE — TELEPHONE ENCOUNTER
----- Message from LONDON Posey sent at 4/18/2022 12:44 AM EDT -----  Notify pt plaquing is seen bilaterally in the carotids but stenosis is less than 25%. It appears patient also has some emphysema and 6 mm right upper lobe pulmonary nodule. There is no PCP listed for the patient. We can refer him to pulmonology if he needs us to.

## 2022-04-18 NOTE — TELEPHONE ENCOUNTER
SW patient regarding results and recommendations. Patient does not have PCP. Patient agreeable for pulmonary referral. Referral placed

## 2022-05-10 ENCOUNTER — TELEPHONE (OUTPATIENT)
Dept: CARDIOLOGY | Facility: CLINIC | Age: 62
End: 2022-05-10

## 2022-05-10 NOTE — TELEPHONE ENCOUNTER
----- Message from LONDON Posey sent at 5/10/2022 11:03 AM EDT -----  Notify pt holter result: Maximum heart rate was 82.  Minimal heart rate 44.  Average heart rate 54.  There are frequent PACs (11% of all beats) extra beats in bottom of heart, can be felt as palpitations, continue current dose of metoprolol. He can take extra dose for increased heart rate or palpitations.  There is no significant tachy or bradycardia arrhythmias. Continue with October follow up as scheduled

## 2022-05-19 ENCOUNTER — OFFICE VISIT (OUTPATIENT)
Dept: PULMONOLOGY | Facility: CLINIC | Age: 62
End: 2022-05-19

## 2022-05-19 VITALS
HEART RATE: 53 BPM | HEIGHT: 70 IN | WEIGHT: 228 LBS | OXYGEN SATURATION: 95 % | DIASTOLIC BLOOD PRESSURE: 69 MMHG | BODY MASS INDEX: 32.64 KG/M2 | TEMPERATURE: 98.6 F | RESPIRATION RATE: 19 BRPM | SYSTOLIC BLOOD PRESSURE: 119 MMHG

## 2022-05-19 DIAGNOSIS — K21.9 GERD WITHOUT ESOPHAGITIS: ICD-10-CM

## 2022-05-19 DIAGNOSIS — G47.33 OSA (OBSTRUCTIVE SLEEP APNEA): ICD-10-CM

## 2022-05-19 DIAGNOSIS — R93.89 ABNORMAL CHEST CT: ICD-10-CM

## 2022-05-19 DIAGNOSIS — I25.10 CORONARY ARTERY DISEASE INVOLVING NATIVE HEART WITHOUT ANGINA PECTORIS, UNSPECIFIED VESSEL OR LESION TYPE: ICD-10-CM

## 2022-05-19 DIAGNOSIS — I67.9 CEREBROVASCULAR DISEASE: ICD-10-CM

## 2022-05-19 DIAGNOSIS — Z71.6 ENCOUNTER FOR SMOKING CESSATION COUNSELING: ICD-10-CM

## 2022-05-19 DIAGNOSIS — J44.9 CHRONIC OBSTRUCTIVE PULMONARY DISEASE, UNSPECIFIED COPD TYPE: Primary | ICD-10-CM

## 2022-05-19 PROCEDURE — 99204 OFFICE O/P NEW MOD 45 MIN: CPT | Performed by: INTERNAL MEDICINE

## 2022-05-19 PROCEDURE — 94618 PULMONARY STRESS TESTING: CPT | Performed by: INTERNAL MEDICINE

## 2022-05-19 NOTE — PROGRESS NOTES
Primary Care Provider  Provider, No Known   Referring Provider  RODERICK Posey*      Patient Complaint  Establish Care, 6mm Lung Nodule , and Cough      Subjective          Marquis Pool presents to [facility] PULMONARY & CRITICAL CARE MEDICINE      History of Presenting Illness  Marquis Pool is a 62 y.o. male   retired newspaper and director with COPD, coronary artery disease, snoring, GERD and a history of TIA who presents for evaluation of an abnormal chest CT.    2010: Acute large MI with subsequent coronary stents x6 last stent 2012    3/16/2022: TIA/RIND with acute right arm weakness and speech changes evaluated in ER and released    3/22/2022: Cardiology consultation with recommendation to stop smoking and see neurology    4/15/2022: CT carotid angiogram incidental finding right upper lobe 6 mm nodule and 1.9 cm right hilar node and subcarinal adenopathy and pulmonary consultation requested    5/19/2022: Dyspnea with exertion mild walking 2 flights of stairs or approximately 100 yards.  Limited more by knee pain than dyspnea per se.  Occasional rare cough but no productive phlegm.  Wife reports wheezing with exertion intermittently but no chest tightness.  Occasional edema at the end of the day but quite minimal.  No orthopnea and no paroxysmal nocturnal dyspnea.  No angina.    Smoking still up to 2 packs/day  chantix 2012 tried for 2 weeks with no response  Nicotine patch x3 weeks not tolerated due to impaired taste  Wellbutrin currently using x4 weeks with no change in smoking desire    Pneumonia outpatient x3 last episode 2019  No history of asthma  Aspiration rare with solid food about once per month according to wife    Denies headaches,  hemoptysis  Denies chest tightness  No productive phlegm  No sinus drainage   Denies fevers, sweats and chills  PPD n/a   Skin tests n/a       I have personally reviewed the review of systems, past social, family, medical and  surgical histories; and agree with their findings.        Review of Systems  Constitutional symptoms:   Weight loss 8 pounds in the last 1 year but 40 pounds in the last 12 years with dietary changes following acute MI  Ear, nose, throat: Denied complaints  Cardiovascular:   Coronary artery disease with a history of MI but no angina and nitroglycerin never used  Respiratory: Dyspnea and wheezing  Gastrointestinal: no N&V, diarrhea, chronic acid reflux controlled with daily omeprazole  Musculoskeletal: Chronic knee pain limiting activity  Genitourinary: Denied complaints  Allergy / Immunology: Denied complaints  Hematologic: Denied complaints  Neurologic: Denied complaints  Skin: Denied complaints  Endocrine: Denied complaints  Psychiatric: Denied complaints  Sleep: Mild snoring, but decreased with weight loss following acute MI , definite witnessed apneas according to wife but less common recently, significant daytime somnolence and falls asleep easily in a television, fell asleep in the waiting room today, gets sleepy as a passenger in a car, etc.  Somniloquy about once per week      Family History   Problem Relation Age of Onset   • Heart attack Mother         Mother  83 years old dementia   • No Known Problems Father         Father  cause unknown        Social History     Socioeconomic History   • Marital status:    Tobacco Use   • Smoking status: Current Every Day Smoker     Packs/day: 1.50     Years: 40.00     Pack years: 60.00     Types: Cigarettes   • Smokeless tobacco: Never Used   • Tobacco comment: Smoked 1.5 to 2 packs/day for 40 years between the ages of 22 and 62 total estimate 60 pack years and still smoking up to 2 packs/day currently   Vaping Use   • Vaping Use: Never used   Substance and Sexual Activity   • Alcohol use: Never   • Drug use: Defer   • Sexual activity: Defer        Past Medical History:   Diagnosis Date   • CAD S/P percutaneous coronary angioplasty 2022   •  "CVA 03/21/2022   • Essential hypertension 03/21/2022   • Hyperlipidemia LDL goal <70 03/21/2022   • Myocardial infarction (HCC)    • Smoker 03/21/2022   • TIA (transient ischemic attack)         Immunization History   Administered Date(s) Administered   • COVID-19 (PFIZER) PURPLE CAP 03/23/2021, 04/13/2021   • Flu Vaccine Intradermal Quad 18-64YR 10/01/2021         No Known Allergies       Current Outpatient Medications:   •  amLODIPine (NORVASC) 5 MG tablet, Take 1 tablet by mouth Daily., Disp: 90 tablet, Rfl: 1  •  aspirin 81 MG chewable tablet, Chew 81 mg Daily., Disp: , Rfl:   •  buPROPion SR (Wellbutrin SR) 150 MG 12 hr tablet, Take 1 tablet by mouth 2 (Two) Times a Day., Disp: 180 tablet, Rfl: 1  •  clopidogrel (Plavix) 75 MG tablet, Take 1 tablet by mouth Daily., Disp: 30 tablet, Rfl: 0  •  metoprolol tartrate (LOPRESSOR) 50 MG tablet, Take 1/2 tablet by mouth twice daily., Disp: 90 tablet, Rfl: 3  •  prasugrel (EFFIENT) 10 MG tablet, Take 1 tablet by mouth Daily., Disp: 90 tablet, Rfl: 3  •  rosuvastatin (CRESTOR) 20 MG tablet, Take 20 mg by mouth Every Night., Disp: , Rfl:   •  varenicline (CHANTIX CODY) 0.5 MG X 11 & 1 MG X 42 tablet, Use as directed on package instructions, try to quit smoking after 1 week, Disp: 53 tablet, Rfl: 0         Objective     Vital Signs:   /69 (BP Location: Left arm, Patient Position: Sitting, Cuff Size: Large Adult)   Pulse 53   Temp 98.6 °F (37 °C) (Temporal)   Resp 19   Ht 177.8 cm (70\")   Wt 103 kg (228 lb)   SpO2 95% Comment: room air  BMI 32.71 kg/m²     Physical Exam  Vital Signs Reviewed   BMI 33, Alert, NAD.    HEENT:   Small crowded oropharynx with mallampati index 4/4; PERRL, EOMI.  OP, nares 1-2+ congestion, no sinus tenderness  Neck:  Supple, no JVD, no thyromegaly  Lymph: no axillary, cervical, supraclavicular lymphadenopathy noted bilaterally  Chest:   No wheezing; no pleural friction rubs, no work of breathing noted; moderate prolonged expiration; " mild to moderate hyperinflation;   CV: RRR, no MGR, pulses 2+, equal.  Abd:  Soft, NT, ND, + BS, no HSM  EXT:  no clubbing, no cyanosis, no edema, no joint tenderness  Neuro:  A&O x 3, CN grossly intact, no focal deficits.  Skin: No rashes or lesions noted       Result Review :     Lab 3/16/2022: Creatinine 0.94; bicarbonate 23; glucose 126  Hematocrit 43%; WBC 9970    CXR 3/16/2022: Perihilar and basilar airspace disease suggesting right lower lobe pneumonia  To my independent review the left hemidiaphragm is elevated not mentioned in the report    CT carotid angio 4/15/2022:  6 mm right upper lobe nodule  Right hilar 1.9 cm node  Subcarinal adenopathy  Emphysema  Less than 25% carotid stenoses/nonobstructive  Left maxillary sinus polyp versus cyst retention    MRI brain 3/16/2022: [facility]    Echo 3/21/2022: EF 60% with trace tricuspid regurgitation    Cardiac stress test 7/20/2021: Keenan protocol 14% and 3.4 mph with maximal blood pressure 174/96 and maximum heart rate only 77; saturation 96% at rest and 97% at peak exercise with total 6 minutes of exercise with no evidence of ischemia    Holter monitor 5/5/2022: nsr 44 to 82 bpm with occasional PACs    I have personally reviewed the above    No results found for: CBCDIF, AMBIGCMP, TSHBASE, BNP, DDIMER, AFPTM     No results found for: SITE, ALLENTEST, PHART, WIM9CQN, PO2ART, IFG9FFC, BASEEXCESS, O4UJYWIW, HGBBG, HCTABG, OXYHEMOGLOBI, METHHGBN, CARBOXYHGB, CO2CT, BAROMETRIC, MODALITY, FIO2                  Results for orders placed in visit on 05/05/22    Adult Transthoracic Echo Complete W/ Cont if Necessary Per Protocol    Interpretation Summary  Normal left ventricular systolic function.  Trace MR and trace TR         CARDIAC LABS:         Pulmonary Functions Testing Results:    No results found for: FEV1, FVC, EPJ8PBP, TLC, DLCO              Assessment and Plan      Patient Active Problem List   Diagnosis   • CAD S/P percutaneous coronary angioplasty   •  Hyperlipidemia LDL goal <70   • Essential hypertension   • Smoker   • CVA           Impression:    1.  COPD: 60-pack-year smoking  Clinically mild  PFT pending    2.  Abnormal chest CT: 6 mm RUL SPN  BTS Don malignancy risk calculator 1.8%  Indeterminant  Follow-up indicated  Right hilar and mediastinal adenopathy a concern but most likely reactive/inflammatory    3.  ROYA: clinical dx with crowded airway and PSG pending    4.  Coronary artery disease s/p stents x6 with EF 60%    5.  Cerebrovascular disease s/p RIND x2 potentially triggered by sleep apnea    6.  GERD          Plan:    PFT  6MW-->96% to 95% at 6 mins and 153 meters    Chest CT ordered  Depending on results, consider fluoroscopy to assess left hemidiaphragm elevation next visit    Smoking cessation counseling done  chantix ordered/side effects discussed  Taper Wellbutrin to 150 mg/day for 2 weeks and then stop since it has not been effective    No need for bronchodilator therapy at this time given lack of significant symptoms    Pulmonary rehabilitation ordered/encouraged    PSG home study    Aspiration precautions discussed/advised    Weight loss  Exercise  Sleep on sides/avoid supine position    COVID-vaccine done    Discussed with wife Haley kobi    2 to 3-month follow-up    Medications personally reviewed.      Follow Up   Return in about 2 months (around 7/19/2022).  Patient was given instructions and counseling regarding his condition or for health maintenance advice. Please see specific information pulled into the AVS if appropriate.

## 2022-05-23 RX ORDER — AMLODIPINE BESYLATE 5 MG/1
TABLET ORAL
Qty: 90 TABLET | Refills: 1 | Status: SHIPPED | OUTPATIENT
Start: 2022-05-23 | End: 2022-08-31 | Stop reason: SDUPTHER

## 2022-08-26 ENCOUNTER — HOSPITAL ENCOUNTER (OUTPATIENT)
Dept: RESPIRATORY THERAPY | Facility: HOSPITAL | Age: 62
End: 2022-08-26

## 2022-08-26 ENCOUNTER — APPOINTMENT (OUTPATIENT)
Dept: CT IMAGING | Facility: HOSPITAL | Age: 62
End: 2022-08-26

## 2022-08-31 RX ORDER — PRASUGREL 10 MG/1
TABLET, FILM COATED ORAL
Qty: 90 TABLET | Refills: 0 | OUTPATIENT
Start: 2022-08-31

## 2022-08-31 RX ORDER — AMLODIPINE BESYLATE 5 MG/1
5 TABLET ORAL DAILY
Qty: 90 TABLET | Refills: 0 | Status: SHIPPED | OUTPATIENT
Start: 2022-08-31 | End: 2022-12-08 | Stop reason: SDUPTHER

## 2022-08-31 RX ORDER — PRASUGREL 10 MG/1
TABLET, FILM COATED ORAL
Qty: 90 TABLET | Refills: 0 | Status: SHIPPED | OUTPATIENT
Start: 2022-08-31 | End: 2022-09-01 | Stop reason: SDUPTHER

## 2022-09-01 RX ORDER — PRASUGREL 10 MG/1
10 TABLET, FILM COATED ORAL DAILY
Qty: 4 TABLET | Refills: 0 | Status: SHIPPED | OUTPATIENT
Start: 2022-09-01 | End: 2022-12-08 | Stop reason: SDUPTHER

## 2022-12-08 RX ORDER — METOPROLOL TARTRATE 50 MG/1
TABLET, FILM COATED ORAL
Qty: 30 TABLET | Refills: 0 | Status: SHIPPED | OUTPATIENT
Start: 2022-12-08 | End: 2022-12-13 | Stop reason: SDUPTHER

## 2022-12-08 RX ORDER — PRASUGREL 10 MG/1
10 TABLET, FILM COATED ORAL DAILY
Qty: 30 TABLET | Refills: 0 | Status: SHIPPED | OUTPATIENT
Start: 2022-12-08 | End: 2022-12-13 | Stop reason: SDUPTHER

## 2022-12-08 RX ORDER — NITROGLYCERIN 0.4 MG/1
0.4 TABLET SUBLINGUAL
Qty: 25 TABLET | Refills: 0 | Status: SHIPPED | OUTPATIENT
Start: 2022-12-08 | End: 2022-12-13 | Stop reason: SDUPTHER

## 2022-12-08 RX ORDER — ROSUVASTATIN CALCIUM 20 MG/1
20 TABLET, COATED ORAL NIGHTLY
Qty: 30 TABLET | Refills: 0 | Status: SHIPPED | OUTPATIENT
Start: 2022-12-08 | End: 2022-12-13 | Stop reason: SDUPTHER

## 2022-12-08 RX ORDER — AMLODIPINE BESYLATE 5 MG/1
5 TABLET ORAL DAILY
Qty: 30 TABLET | Refills: 0 | Status: SHIPPED | OUTPATIENT
Start: 2022-12-08 | End: 2022-12-13 | Stop reason: SDUPTHER

## 2022-12-08 RX ORDER — NITROGLYCERIN 0.4 MG/1
0.4 TABLET SUBLINGUAL
COMMUNITY
End: 2022-12-08 | Stop reason: SDUPTHER

## 2022-12-08 NOTE — TELEPHONE ENCOUNTER
Last OV:03-22-22    Next  OV: 12-13-22    Medication matches last office note     Must keep appointment for future refills

## 2022-12-13 ENCOUNTER — OFFICE VISIT (OUTPATIENT)
Dept: CARDIOLOGY | Facility: CLINIC | Age: 62
End: 2022-12-13

## 2022-12-13 VITALS
BODY MASS INDEX: 34.36 KG/M2 | HEIGHT: 70 IN | HEART RATE: 55 BPM | SYSTOLIC BLOOD PRESSURE: 126 MMHG | WEIGHT: 240 LBS | DIASTOLIC BLOOD PRESSURE: 57 MMHG

## 2022-12-13 DIAGNOSIS — I10 HYPERTENSION, ESSENTIAL: ICD-10-CM

## 2022-12-13 DIAGNOSIS — I25.10 CORONARY ARTERY DISEASE INVOLVING NATIVE CORONARY ARTERY OF NATIVE HEART WITHOUT ANGINA PECTORIS: Primary | ICD-10-CM

## 2022-12-13 DIAGNOSIS — E78.2 HYPERLIPEMIA, MIXED: ICD-10-CM

## 2022-12-13 DIAGNOSIS — Z95.5 HISTORY OF CORONARY ANGIOPLASTY WITH INSERTION OF STENT: ICD-10-CM

## 2022-12-13 PROCEDURE — 99214 OFFICE O/P EST MOD 30 MIN: CPT | Performed by: SPECIALIST

## 2022-12-13 RX ORDER — ROSUVASTATIN CALCIUM 20 MG/1
20 TABLET, COATED ORAL NIGHTLY
Qty: 90 TABLET | Refills: 3 | Status: SHIPPED | OUTPATIENT
Start: 2022-12-13

## 2022-12-13 RX ORDER — NITROGLYCERIN 0.4 MG/1
0.4 TABLET SUBLINGUAL
Qty: 25 TABLET | Refills: 0 | Status: SHIPPED | OUTPATIENT
Start: 2022-12-13

## 2022-12-13 RX ORDER — PRASUGREL 10 MG/1
10 TABLET, FILM COATED ORAL DAILY
Qty: 90 TABLET | Refills: 3 | Status: SHIPPED | OUTPATIENT
Start: 2022-12-13

## 2022-12-13 RX ORDER — METOPROLOL TARTRATE 50 MG/1
TABLET, FILM COATED ORAL
Qty: 90 TABLET | Refills: 3 | Status: SHIPPED | OUTPATIENT
Start: 2022-12-13

## 2022-12-13 RX ORDER — ASPIRIN 81 MG/1
81 TABLET, CHEWABLE ORAL DAILY
Qty: 90 TABLET | Refills: 3 | Status: SHIPPED | OUTPATIENT
Start: 2022-12-13

## 2022-12-13 RX ORDER — AMLODIPINE BESYLATE 5 MG/1
5 TABLET ORAL DAILY
Qty: 90 TABLET | Refills: 3 | Status: SHIPPED | OUTPATIENT
Start: 2022-12-13

## 2023-09-20 DIAGNOSIS — E78.2 HYPERLIPEMIA, MIXED: ICD-10-CM

## 2023-09-23 NOTE — PROGRESS NOTES
[facility]  Cardiology progress Note    Patient Name: Marquis Pool  : 1960    CHIEF COMPLAINT  CAD        Subjective   Subjective     HISTORY OF PRESENT ILLNESS    Marquis Pool is a 63 y.o. male with history of CAD s/p PTCA/stent.  No chest pain.    REVIEW OF SYSTEMS    Constitutional:    No fever, no weight loss  Skin:     No rash  Otolaryngeal:    No difficulty swallowing  Cardiovascular: See HPI.  Pulmonary:    No cough, no sputum production    Personal History     Social History:    reports that he has been smoking cigarettes. He has a 60.00 pack-year smoking history. He has never used smokeless tobacco. Drug use questions deferred to the physician. He reports that he does not drink alcohol.    Home Medications:  Current Outpatient Medications on File Prior to Visit   Medication Sig    aspirin 81 MG chewable tablet Chew 1 tablet Daily.    [DISCONTINUED] amLODIPine (NORVASC) 5 MG tablet Take 1 tablet by mouth Daily.    [DISCONTINUED] metoprolol tartrate (LOPRESSOR) 50 MG tablet Take 1/2 tablet by mouth twice daily.    [DISCONTINUED] nitroglycerin (NITROSTAT) 0.4 MG SL tablet Place 1 tablet under the tongue Every 5 (Five) Minutes As Needed for Chest Pain. Take no more than 3 doses in 15 minutes.    [DISCONTINUED] prasugrel (EFFIENT) 10 MG tablet Take 1 tablet by mouth Daily.    [DISCONTINUED] rosuvastatin (CRESTOR) 20 MG tablet Take 1 tablet by mouth Every Night.     No current facility-administered medications on file prior to visit.       Past Medical History:   Diagnosis Date    CAD S/P percutaneous coronary angioplasty 2022    CVA 2022    Essential hypertension 2022    Hyperlipidemia LDL goal <70 2022    Myocardial infarction     Smoker 2022    TIA (transient ischemic attack)        Allergies:  No Known Allergies    Objective    Objective       Vitals:   Heart Rate:  [58] 58  BP: (152)/(77) 152/77  Body mass index is 34.12 kg/m².     PHYSICAL EXAM:    General  Appearance:   well developed  well nourished  HENT:   oropharynx moist  lips not cyanotic  Neck:  thyroid not enlarged  supple  Respiratory:  no respiratory distress  normal breath sounds  no rales  Cardiovascular:  no jugular venous distention  regular rhythm  apical impulse normal  S1 normal, S2 normal  no S3, no S4   no murmur  no rub, no thrill  carotid pulses normal; no bruit  pedal pulses normal  lower extremity edema: none    Skin:   warm, dry  Psychiatric:  judgement and insight appropriate  normal mood and affect        Result Review:  I have personally reviewed the available results from  [x]  Laboratory  [x]  EKG  [x]  Cardiology  [x]  Medications  [x]  Old records  []  Other:     Procedures    Results for orders placed in visit on 05/05/22    Adult Transthoracic Echo Complete W/ Cont if Necessary Per Protocol    Interpretation Summary  Normal left ventricular systolic function.  Trace MR and trace TR     Impression/Plan:  1.  CAD s/p PTCA/stent stable: Continue aspirin 81 mg once a day.  Continue Effient 10 mg once a day.  Continue Toprol-XL 50 mg once a day.  2.  Positive nicotine use: Smoking cessation discussed with patient.  3.  Essential hypertension controlled: Continue amlodipine 5 mg once a day.  Monitor blood pressure regularly.  4.  Hyperlipidemia: Continue Crestor 20 mg once a day.  Monitor lipid and hepatic profile.           Nikhil Corado MD   09/26/23   15:19 EDT

## 2023-09-26 ENCOUNTER — OFFICE VISIT (OUTPATIENT)
Dept: CARDIOLOGY | Facility: CLINIC | Age: 63
End: 2023-09-26
Payer: COMMERCIAL

## 2023-09-26 VITALS
HEART RATE: 58 BPM | WEIGHT: 237.8 LBS | HEIGHT: 70 IN | DIASTOLIC BLOOD PRESSURE: 77 MMHG | BODY MASS INDEX: 34.04 KG/M2 | SYSTOLIC BLOOD PRESSURE: 152 MMHG

## 2023-09-26 DIAGNOSIS — I10 HYPERTENSION, ESSENTIAL: Primary | ICD-10-CM

## 2023-09-26 DIAGNOSIS — I25.10 CORONARY ARTERY DISEASE INVOLVING NATIVE CORONARY ARTERY OF NATIVE HEART WITHOUT ANGINA PECTORIS: ICD-10-CM

## 2023-09-26 DIAGNOSIS — Z72.0 NICOTINE USE: ICD-10-CM

## 2023-09-26 DIAGNOSIS — E78.2 HYPERLIPEMIA, MIXED: ICD-10-CM

## 2023-09-26 PROCEDURE — 99214 OFFICE O/P EST MOD 30 MIN: CPT | Performed by: SPECIALIST

## 2023-09-26 RX ORDER — AMLODIPINE BESYLATE 5 MG/1
5 TABLET ORAL DAILY
Qty: 90 TABLET | Refills: 3 | Status: SHIPPED | OUTPATIENT
Start: 2023-09-26

## 2023-09-26 RX ORDER — METOPROLOL TARTRATE 50 MG/1
TABLET, FILM COATED ORAL
Qty: 90 TABLET | Refills: 3 | Status: SHIPPED | OUTPATIENT
Start: 2023-09-26

## 2023-09-26 RX ORDER — PRASUGREL 10 MG/1
10 TABLET, FILM COATED ORAL DAILY
Qty: 90 TABLET | Refills: 3 | Status: SHIPPED | OUTPATIENT
Start: 2023-09-26

## 2023-09-26 RX ORDER — ROSUVASTATIN CALCIUM 20 MG/1
20 TABLET, COATED ORAL NIGHTLY
Qty: 90 TABLET | Refills: 3 | Status: SHIPPED | OUTPATIENT
Start: 2023-09-26

## 2023-09-26 RX ORDER — NITROGLYCERIN 0.4 MG/1
0.4 TABLET SUBLINGUAL
Qty: 25 TABLET | Refills: 0 | Status: SHIPPED | OUTPATIENT
Start: 2023-09-26

## 2024-01-08 NOTE — PROGRESS NOTES
[facility]  Cardiology progress Note    Patient Name: Marquis Pool  : 1960    CHIEF COMPLAINT  Coronary artery disease        Subjective   Subjective     HISTORY OF PRESENT ILLNESS    Marquis Pool is a 62 y.o. male with coronary disease status post PTCA/stent.  No chest pain or shortness of breath.    REVIEW OF SYSTEMS    Constitutional:    No fever, no weight loss  Skin:     No rash  Otolaryngeal:    No difficulty swallowing  Cardiovascular: See HPI.  Pulmonary:    No cough, no sputum production    Personal History     Social History:    reports that he has been smoking cigarettes. He has a 60.00 pack-year smoking history. He has never used smokeless tobacco. Drug use questions deferred to the physician. He reports that he does not drink alcohol.    Home Medications:  Current Outpatient Medications on File Prior to Visit   Medication Sig   • [DISCONTINUED] amLODIPine (NORVASC) 5 MG tablet Take 1 tablet by mouth Daily.   • [DISCONTINUED] aspirin 81 MG chewable tablet Chew 81 mg Daily.   • [DISCONTINUED] metoprolol tartrate (LOPRESSOR) 50 MG tablet Take 1/2 tablet by mouth twice daily.   • [DISCONTINUED] nitroglycerin (NITROSTAT) 0.4 MG SL tablet Place 1 tablet under the tongue Every 5 (Five) Minutes As Needed for Chest Pain. Take no more than 3 doses in 15 minutes.   • [DISCONTINUED] prasugrel (EFFIENT) 10 MG tablet Take 1 tablet by mouth Daily.   • [DISCONTINUED] rosuvastatin (CRESTOR) 20 MG tablet Take 1 tablet by mouth Every Night.   • [DISCONTINUED] buPROPion SR (Wellbutrin SR) 150 MG 12 hr tablet Take 1 tablet by mouth 2 (Two) Times a Day.   • [DISCONTINUED] clopidogrel (Plavix) 75 MG tablet Take 1 tablet by mouth Daily.   • [DISCONTINUED] varenicline (CHANTIX CODY) 0.5 MG X 11 & 1 MG X 42 tablet Use as directed on package instructions, try to quit smoking after 1 week     No current facility-administered medications on file prior to visit.       Past Medical History:   Diagnosis Date   • CAD  Detail Level: Simple S/P percutaneous coronary angioplasty 03/21/2022   • CVA 03/21/2022   • Essential hypertension 03/21/2022   • Hyperlipidemia LDL goal <70 03/21/2022   • Myocardial infarction (HCC)    • Smoker 03/21/2022   • TIA (transient ischemic attack)        Allergies:  No Known Allergies    Objective    Objective       Vitals:   Heart Rate:  [55] 55  BP: (126)/(57) 126/57  Body mass index is 34.44 kg/m².     PHYSICAL EXAM:    General Appearance:   · well developed  · well nourished  HENT:   · oropharynx moist  · lips not cyanotic  Neck:  · thyroid not enlarged  · supple  Respiratory:  · no respiratory distress  · normal breath sounds  · no rales  Cardiovascular:  · no jugular venous distention  · regular rhythm  · apical impulse normal  · S1 normal, S2 normal  · no S3, no S4   · no murmur  · no rub, no thrill  · carotid pulses normal; no bruit  · pedal pulses normal  · lower extremity edema: none    Skin:   · warm, dry  Psychiatric:  · judgement and insight appropriate  · normal mood and affect        Result Review:  I have personally reviewed the available results from  [x]  Laboratory  [x]  EKG  [x]  Cardiology  [x]  Medications  [x]  Old records  []  Other:     Procedures  Results for orders placed in visit on 05/05/22    Adult Transthoracic Echo Complete W/ Cont if Necessary Per Protocol    Interpretation Summary  Normal left ventricular systolic function.  Trace MR and trace TR     Impression/Plan:  1.  Essential hypertension controlled: Continue amlodipine 5 mg a day.  Monitor blood pressure regularly.  2.  Positive for nicotine use: Smoking cessation discussed with patient.  3.  Coronary disease s/p PTCA/stent stable: Continue aspirin 81 mg a day.  Continue Effient 10 mg a day.  Continue Toprol-XL 50 mg a day.  4.  Hyperlipidemia: Continue Crestor 20 mg a day.  Monitor lipid and hepatic profile.           Nikhil Corado MD   12/13/22   14:06 EST    Price (Do Not Change): 0.00 Instructions: This plan will send the code FBSE to the PM system.  DO NOT or CHANGE the price.

## 2024-03-28 ENCOUNTER — TELEPHONE (OUTPATIENT)
Dept: CARDIOLOGY | Facility: CLINIC | Age: 64
End: 2024-03-28
Payer: COMMERCIAL

## 2024-03-28 NOTE — TELEPHONE ENCOUNTER
Caller: Marquis Pool    Relationship: Self    Best call back number: [phone] (home)     What is the best time to reach you: ANY    Who are you requesting to speak with (clinical staff, provider,  specific staff member): CLINICAL    What was the call regarding: PT IS GETTING KNEE REPLACEMENT ON 4.16.24 AND IS NEEDING TO BE OFF OF HIS BLOOD THINNERS 3/5 DAYS BEFORE PROCEDURE. PLEASE CALL PT BACK AND LET HIM KNOW IF THIS WILL BE OKAY, WILL BE HAVING HIS REPLACEMENT AT [facility] BY DR. MONET MARTINEZ    Is it okay if the provider responds through MyChart: PLEASE CALL

## 2024-03-28 NOTE — TELEPHONE ENCOUNTER
Procedure: Knee Replacement     Med Directive: Effient     PMH: CAD  prior stent, HTN, HLD    Last Seen: 9/26/23

## 2024-05-17 ENCOUNTER — LAB (OUTPATIENT)
Dept: LAB | Facility: HOSPITAL | Age: 64
End: 2024-05-17
Payer: COMMERCIAL

## 2024-05-17 DIAGNOSIS — E78.2 HYPERLIPEMIA, MIXED: ICD-10-CM

## 2024-05-17 LAB
ALBUMIN SERPL-MCNC: 4.2 G/DL (ref 3.5–5.2)
ALP SERPL-CCNC: 107 U/L (ref 39–117)
ALT SERPL W P-5'-P-CCNC: 18 U/L (ref 1–41)
AST SERPL-CCNC: 15 U/L (ref 1–40)
BILIRUB CONJ SERPL-MCNC: <0.2 MG/DL (ref 0–0.3)
BILIRUB INDIRECT SERPL-MCNC: NORMAL MG/DL
BILIRUB SERPL-MCNC: 0.7 MG/DL (ref 0–1.2)
CHOLEST SERPL-MCNC: 167 MG/DL (ref 0–200)
HDLC SERPL-MCNC: 42 MG/DL (ref 40–60)
LDLC SERPL CALC-MCNC: 98 MG/DL (ref 0–100)
LDLC/HDLC SERPL: 2.24 {RATIO}
PROT SERPL-MCNC: 7.1 G/DL (ref 6–8.5)
TRIGL SERPL-MCNC: 155 MG/DL (ref 0–150)
VLDLC SERPL-MCNC: 27 MG/DL (ref 5–40)

## 2024-05-17 PROCEDURE — 80061 LIPID PANEL: CPT

## 2024-05-17 PROCEDURE — 36415 COLL VENOUS BLD VENIPUNCTURE: CPT

## 2024-05-17 PROCEDURE — 80076 HEPATIC FUNCTION PANEL: CPT

## 2024-05-19 NOTE — PROGRESS NOTES
[facility]  Cardiology progress Note    Patient Name: Marquis Pool  : 1960    CHIEF COMPLAINT  CAD        Subjective   Subjective     HISTORY OF PRESENT ILLNESS    Marquis Pool is a 64 y.o. male with CAD s/p PTCA/stent.  No chest pain or shortness of breath.    REVIEW OF SYSTEMS    Constitutional:    No fever, no weight loss  Skin:     No rash  Otolaryngeal:    No difficulty swallowing  Cardiovascular: See HPI.  Pulmonary:    No cough, no sputum production    Personal History     Social History:    reports that he has been smoking cigarettes. He has a 60 pack-year smoking history. He has never used smokeless tobacco. Drug use questions deferred to the physician. He reports that he does not drink alcohol.    Home Medications:  Current Outpatient Medications on File Prior to Visit   Medication Sig    amLODIPine (NORVASC) 5 MG tablet Take 1 tablet by mouth Daily.    aspirin 81 MG chewable tablet Chew 1 tablet Daily.    metoprolol tartrate (LOPRESSOR) 50 MG tablet Take 1/2 tablet by mouth twice daily.    nitroglycerin (NITROSTAT) 0.4 MG SL tablet Place 1 tablet under the tongue Every 5 (Five) Minutes As Needed for Chest Pain. Take no more than 3 doses in 15 minutes.    prasugrel (EFFIENT) 10 MG tablet Take 1 tablet by mouth Daily.    rosuvastatin (CRESTOR) 20 MG tablet Take 1 tablet by mouth Every Night.     No current facility-administered medications on file prior to visit.       Past Medical History:   Diagnosis Date    CAD S/P percutaneous coronary angioplasty 2022    CVA 2022    Essential hypertension 2022    Hyperlipidemia LDL goal <70 2022    Myocardial infarction     Smoker 2022    TIA (transient ischemic attack)        Allergies:  No Known Allergies    Objective    Objective       Vitals:      There is no height or weight on file to calculate BMI.     PHYSICAL EXAM:    General Appearance:   well developed  well nourished  HENT:   oropharynx moist  lips not  cyanotic  Neck:  thyroid not enlarged  supple  Respiratory:  no respiratory distress  normal breath sounds  no rales  Cardiovascular:  no jugular venous distention  regular rhythm  apical impulse normal  S1 normal, S2 normal  no S3, no S4   no murmur  no rub, no thrill  carotid pulses normal; no bruit  pedal pulses normal  lower extremity edema: none    Skin:   warm, dry  Psychiatric:  judgement and insight appropriate  normal mood and affect        Result Review:  I have personally reviewed the available results from  [x]  Laboratory  [x]  EKG  [x]  Cardiology  [x]  Medications  [x]  Old records  []  Other:     Procedures    Results for orders placed in visit on 05/05/22    Adult Transthoracic Echo Complete W/ Cont if Necessary Per Protocol    Interpretation Summary  Normal left ventricular systolic function.  Trace MR and trace TR     Impression/Plan:  1.  Essential hypertension controlled: Continue amlodipine 5 mg once a day.  Monitor blood pressure regularly.  2.  Mixed hyperlipidemia: Continue Crestor 20 mg once a day.  Compliance with medication advised .monitor lipid and hepatic profile.  3.  CAD s/p PTCA/stent: Continue Effient 10 mg once a day.  Continue aspirin 81 mg once a day.  Continue Toprol-XL 50 mg once a day.  No chest pain.  4.  Positive for smoking: Smoking cessation discussed with patient.         7  Nikhil Corado MD   05/19/24   08:01 EDT

## 2024-05-21 ENCOUNTER — OFFICE VISIT (OUTPATIENT)
Dept: CARDIOLOGY | Facility: CLINIC | Age: 64
End: 2024-05-21
Payer: COMMERCIAL

## 2024-05-21 VITALS
DIASTOLIC BLOOD PRESSURE: 81 MMHG | WEIGHT: 231 LBS | HEIGHT: 70 IN | BODY MASS INDEX: 33.07 KG/M2 | SYSTOLIC BLOOD PRESSURE: 143 MMHG | HEART RATE: 61 BPM

## 2024-05-21 DIAGNOSIS — I25.10 CORONARY ARTERY DISEASE INVOLVING NATIVE CORONARY ARTERY OF NATIVE HEART WITHOUT ANGINA PECTORIS: Primary | ICD-10-CM

## 2024-05-21 DIAGNOSIS — Z95.5 HISTORY OF CORONARY ANGIOPLASTY WITH INSERTION OF STENT: ICD-10-CM

## 2024-05-21 DIAGNOSIS — I10 HYPERTENSION, ESSENTIAL: ICD-10-CM

## 2024-05-21 DIAGNOSIS — E78.2 HYPERLIPEMIA, MIXED: ICD-10-CM

## 2024-05-21 PROCEDURE — 99214 OFFICE O/P EST MOD 30 MIN: CPT | Performed by: SPECIALIST

## 2024-10-01 RX ORDER — ROSUVASTATIN CALCIUM 20 MG/1
20 TABLET, COATED ORAL NIGHTLY
Qty: 90 TABLET | Refills: 3 | Status: SHIPPED | OUTPATIENT
Start: 2024-10-01

## 2024-10-01 RX ORDER — METOPROLOL TARTRATE 50 MG
TABLET ORAL
Qty: 90 TABLET | Refills: 0 | Status: SHIPPED | OUTPATIENT
Start: 2024-10-01

## 2024-10-01 RX ORDER — AMLODIPINE BESYLATE 5 MG/1
5 TABLET ORAL DAILY
Qty: 90 TABLET | Refills: 0 | Status: SHIPPED | OUTPATIENT
Start: 2024-10-01

## 2024-10-01 RX ORDER — PRASUGREL 10 MG/1
10 TABLET, FILM COATED ORAL DAILY
Qty: 90 TABLET | Refills: 3 | Status: SHIPPED | OUTPATIENT
Start: 2024-10-01

## 2024-11-12 ENCOUNTER — TELEPHONE (OUTPATIENT)
Dept: CARDIOLOGY | Facility: CLINIC | Age: 64
End: 2024-11-12
Payer: COMMERCIAL

## 2024-11-12 NOTE — TELEPHONE ENCOUNTER
[facility] received a fax that requires your attention. The document has been indexed to the patient’s chart for your review.      Reason for sending: EXTERNAL MEDICAL RECORD NOTIFICATION     Documents Description: MED REC REQ-[facility]-11.12.24    Name of Sender: [facility]    Date Indexed: 11.12.24    WANTING LAST CARDIOLOGY REPORT

## 2024-12-11 ENCOUNTER — HOSPITAL ENCOUNTER (EMERGENCY)
Facility: HOSPITAL | Age: 64
Discharge: HOME | End: 2024-12-11
Attending: EMERGENCY MEDICINE | Admitting: EMERGENCY MEDICINE
Payer: COMMERCIAL

## 2024-12-11 ENCOUNTER — APPOINTMENT (OUTPATIENT)
Dept: GENERAL RADIOLOGY | Facility: HOSPITAL | Age: 64
End: 2024-12-11
Payer: COMMERCIAL

## 2024-12-11 VITALS
SYSTOLIC BLOOD PRESSURE: 146 MMHG | OXYGEN SATURATION: 95 % | HEIGHT: 68 IN | RESPIRATION RATE: 13 BRPM | HEART RATE: 59 BPM | TEMPERATURE: 98 F | BODY MASS INDEX: 33.25 KG/M2 | WEIGHT: 219.36 LBS | DIASTOLIC BLOOD PRESSURE: 76 MMHG

## 2024-12-11 DIAGNOSIS — M25.462 EFFUSION OF LEFT KNEE: ICD-10-CM

## 2024-12-11 DIAGNOSIS — B02.9 THIGH SHINGLES: Primary | ICD-10-CM

## 2024-12-11 LAB
ALBUMIN SERPL-MCNC: 3.9 G/DL (ref 3.5–5.2)
ALBUMIN/GLOB SERPL: 1 G/DL
ALP SERPL-CCNC: 110 U/L (ref 39–117)
ALT SERPL W P-5'-P-CCNC: 11 U/L (ref 1–41)
AMPHET+METHAMPHET UR QL: NEGATIVE
AMPHETAMINES UR QL: NEGATIVE
ANION GAP SERPL CALCULATED.3IONS-SCNC: 11.3 MMOL/L (ref 5–15)
AST SERPL-CCNC: 17 U/L (ref 1–40)
BARBITURATES UR QL SCN: NEGATIVE
BASOPHILS # BLD AUTO: 0.1 10*3/MM3 (ref 0–0.2)
BASOPHILS NFR BLD AUTO: 1.3 % (ref 0–1.5)
BENZODIAZ UR QL SCN: NEGATIVE
BILIRUB SERPL-MCNC: 0.6 MG/DL (ref 0–1.2)
BILIRUB UR QL STRIP: NEGATIVE
BUN SERPL-MCNC: 14 MG/DL (ref 8–23)
BUN/CREAT SERPL: 15.6 (ref 7–25)
BUPRENORPHINE SERPL-MCNC: NEGATIVE NG/ML
CALCIUM SPEC-SCNC: 9.2 MG/DL (ref 8.6–10.5)
CANNABINOIDS SERPL QL: POSITIVE
CHLORIDE SERPL-SCNC: 100 MMOL/L (ref 98–107)
CLARITY UR: CLEAR
CO2 SERPL-SCNC: 23.7 MMOL/L (ref 22–29)
COCAINE UR QL: NEGATIVE
COLOR UR: YELLOW
CREAT SERPL-MCNC: 0.9 MG/DL (ref 0.76–1.27)
D-LACTATE SERPL-SCNC: 1.2 MMOL/L (ref 0.5–2)
DEPRECATED RDW RBC AUTO: 45.3 FL (ref 37–54)
EGFRCR SERPLBLD CKD-EPI 2021: 94.8 ML/MIN/1.73
EOSINOPHIL # BLD AUTO: 0.28 10*3/MM3 (ref 0–0.4)
EOSINOPHIL NFR BLD AUTO: 3.6 % (ref 0.3–6.2)
ERYTHROCYTE [DISTWIDTH] IN BLOOD BY AUTOMATED COUNT: 13.9 % (ref 12.3–15.4)
ETHANOL BLD-MCNC: <10 MG/DL (ref 0–10)
ETHANOL UR QL: <0.01 %
FENTANYL UR-MCNC: NEGATIVE NG/ML
GLOBULIN UR ELPH-MCNC: 3.9 GM/DL
GLUCOSE SERPL-MCNC: 88 MG/DL (ref 65–99)
GLUCOSE UR STRIP-MCNC: NEGATIVE MG/DL
HCT VFR BLD AUTO: 43.3 % (ref 37.5–51)
HGB BLD-MCNC: 14.2 G/DL (ref 13–17.7)
HGB UR QL STRIP.AUTO: NEGATIVE
HOLD SPECIMEN: NORMAL
HOLD SPECIMEN: NORMAL
IMM GRANULOCYTES # BLD AUTO: 0.01 10*3/MM3 (ref 0–0.05)
IMM GRANULOCYTES NFR BLD AUTO: 0.1 % (ref 0–0.5)
KETONES UR QL STRIP: NEGATIVE
LEUKOCYTE ESTERASE UR QL STRIP.AUTO: NEGATIVE
LYMPHOCYTES # BLD AUTO: 2.15 10*3/MM3 (ref 0.7–3.1)
LYMPHOCYTES NFR BLD AUTO: 27.3 % (ref 19.6–45.3)
MCH RBC QN AUTO: 29.3 PG (ref 26.6–33)
MCHC RBC AUTO-ENTMCNC: 32.8 G/DL (ref 31.5–35.7)
MCV RBC AUTO: 89.5 FL (ref 79–97)
METHADONE UR QL SCN: NEGATIVE
MONOCYTES # BLD AUTO: 0.89 10*3/MM3 (ref 0.1–0.9)
MONOCYTES NFR BLD AUTO: 11.3 % (ref 5–12)
NEUTROPHILS NFR BLD AUTO: 4.44 10*3/MM3 (ref 1.7–7)
NEUTROPHILS NFR BLD AUTO: 56.4 % (ref 42.7–76)
NITRITE UR QL STRIP: NEGATIVE
NRBC BLD AUTO-RTO: 0 /100 WBC (ref 0–0.2)
OPIATES UR QL: NEGATIVE
OXYCODONE UR QL SCN: POSITIVE
PCP UR QL SCN: NEGATIVE
PH UR STRIP.AUTO: 5.5 [PH] (ref 5–8)
PLATELET # BLD AUTO: 379 10*3/MM3 (ref 140–450)
PMV BLD AUTO: 9.9 FL (ref 6–12)
POTASSIUM SERPL-SCNC: 4.5 MMOL/L (ref 3.5–5.2)
PROT SERPL-MCNC: 7.8 G/DL (ref 6–8.5)
PROT UR QL STRIP: NEGATIVE
QT INTERVAL: 449 MS
QTC INTERVAL: 443 MS
RBC # BLD AUTO: 4.84 10*6/MM3 (ref 4.14–5.8)
SODIUM SERPL-SCNC: 135 MMOL/L (ref 136–145)
SP GR UR STRIP: 1.02 (ref 1–1.03)
TRICYCLICS UR QL SCN: NEGATIVE
UROBILINOGEN UR QL STRIP: NORMAL
WBC NRBC COR # BLD AUTO: 7.87 10*3/MM3 (ref 3.4–10.8)
WHOLE BLOOD HOLD COAG: NORMAL
WHOLE BLOOD HOLD SPECIMEN: NORMAL

## 2024-12-11 PROCEDURE — 83605 ASSAY OF LACTIC ACID: CPT

## 2024-12-11 PROCEDURE — 96375 TX/PRO/DX INJ NEW DRUG ADDON: CPT

## 2024-12-11 PROCEDURE — 25010000002 MORPHINE PER 10 MG: Performed by: EMERGENCY MEDICINE

## 2024-12-11 PROCEDURE — 87040 BLOOD CULTURE FOR BACTERIA: CPT

## 2024-12-11 PROCEDURE — 93005 ELECTROCARDIOGRAM TRACING: CPT | Performed by: EMERGENCY MEDICINE

## 2024-12-11 PROCEDURE — 96374 THER/PROPH/DIAG INJ IV PUSH: CPT

## 2024-12-11 PROCEDURE — 81003 URINALYSIS AUTO W/O SCOPE: CPT

## 2024-12-11 PROCEDURE — 80307 DRUG TEST PRSMV CHEM ANLYZR: CPT

## 2024-12-11 PROCEDURE — 36415 COLL VENOUS BLD VENIPUNCTURE: CPT

## 2024-12-11 PROCEDURE — 73562 X-RAY EXAM OF KNEE 3: CPT

## 2024-12-11 PROCEDURE — 85025 COMPLETE CBC W/AUTO DIFF WBC: CPT

## 2024-12-11 PROCEDURE — 99284 EMERGENCY DEPT VISIT MOD MDM: CPT

## 2024-12-11 PROCEDURE — 80053 COMPREHEN METABOLIC PANEL: CPT

## 2024-12-11 PROCEDURE — 82077 ASSAY SPEC XCP UR&BREATH IA: CPT

## 2024-12-11 PROCEDURE — 25010000002 ONDANSETRON PER 1 MG

## 2024-12-11 RX ORDER — ONDANSETRON 2 MG/ML
4 INJECTION INTRAMUSCULAR; INTRAVENOUS ONCE
Status: COMPLETED | OUTPATIENT
Start: 2024-12-11 | End: 2024-12-11

## 2024-12-11 RX ORDER — CHOLECALCIFEROL (VITAMIN D3) 1250 MCG
1 CAPSULE ORAL WEEKLY
COMMUNITY
Start: 2024-11-18

## 2024-12-11 RX ORDER — DOCUSATE SODIUM 100 MG/1
1 CAPSULE, LIQUID FILLED ORAL EVERY 12 HOURS SCHEDULED
COMMUNITY
Start: 2024-11-18

## 2024-12-11 RX ORDER — GABAPENTIN 300 MG/1
300 CAPSULE ORAL
COMMUNITY
Start: 2024-12-03

## 2024-12-11 RX ORDER — TRAMADOL HYDROCHLORIDE 50 MG/1
50 TABLET ORAL
COMMUNITY
Start: 2024-12-03

## 2024-12-11 RX ORDER — ACETAMINOPHEN 325 MG/1
325 TABLET ORAL
COMMUNITY
Start: 2024-11-18

## 2024-12-11 RX ORDER — VALACYCLOVIR HYDROCHLORIDE 1 G/1
1000 TABLET, FILM COATED ORAL
COMMUNITY
Start: 2024-12-06

## 2024-12-11 RX ORDER — CELECOXIB 400 MG/1
400 CAPSULE ORAL
COMMUNITY
Start: 2024-11-18

## 2024-12-11 RX ORDER — HYDROCODONE BITARTRATE AND ACETAMINOPHEN 7.5; 325 MG/1; MG/1
1 TABLET ORAL ONCE
Status: COMPLETED | OUTPATIENT
Start: 2024-12-11 | End: 2024-12-11

## 2024-12-11 RX ORDER — OXYCODONE HYDROCHLORIDE 5 MG/1
5 TABLET ORAL
COMMUNITY
Start: 2024-12-03

## 2024-12-11 RX ADMIN — ONDANSETRON 4 MG: 2 INJECTION INTRAMUSCULAR; INTRAVENOUS at 20:38

## 2024-12-11 RX ADMIN — MORPHINE SULFATE 4 MG: 4 INJECTION, SOLUTION INTRAMUSCULAR; INTRAVENOUS at 20:38

## 2024-12-11 RX ADMIN — HYDROCODONE BITARTRATE AND ACETAMINOPHEN 1 TABLET: 7.5; 325 TABLET ORAL at 21:59

## 2024-12-12 NOTE — DISCHARGE INSTRUCTIONS
You for allowing us to provide care for you today.  Your workup today revealed evidence of ongoing left knee effusion and swelling status post your surgery along with known right thigh shingles.  As discussed, I will have you follow-up with orthopedic surgeon for ongoing care management.  Please follow-up with your primary care provider in the next 7 days for ongoing acute care management.  Please follow-up with dermatologist provider as needed for ongoing shingles care and management.  Continue supportive management for pain for your left knee along with Tylenol ibuprofen and pain regimen dosing as appropriate.  Please return to the ED in the event you develop new onset fever, chills, chest pain, shortness of breath.  Acute

## 2024-12-12 NOTE — ED PROVIDER NOTES
"Time: 8:45 PM EST  Date of encounter:  2024  Independent Historian/Clinical History and Information was obtained by:   Patient    History is limited by: N/A    Chief Complaint: Left knee pain and right thigh pain    History of Present Illness:  Patient is a 64 y.o. year old male who presents to the emergency department for evaluation of right inner thigh rash, left knee pain, x 1 day.  Patient has a prior medical history consistent for coronary arteries disease, CVA, TIA, recent knee replacement on 2024.  Patient reports \"my pain is all over\".  Reports that he has been seeing and hearing things \"because I am in so much pain\".  Reports that he has taken some ibuprofen but is ran out of his pain medication.  Denies any known fevers.  Denies any falls or injuries to his left knee recently.  Reports he takes a daily aspirin.  Reports taking a tramadol at 4 PM.  Reports right leg rash was diagnosed with shingles on  by dermatologist.      Patient Care Team  Primary Care Provider: Provider, No Known    Past Medical History:     No Known Allergies  Past Medical History:   Diagnosis Date    CAD S/P percutaneous coronary angioplasty 2022    CVA 2022    Essential hypertension 2022    Hyperlipidemia LDL goal <70 2022    Myocardial infarction     Smoker 2022    TIA (transient ischemic attack)      Past Surgical History:   Procedure Laterality Date    CAROTID STENT       Family History   Problem Relation Age of Onset    Heart attack Mother         Mother  83 years old dementia    No Known Problems Father         Father  cause unknown       Home Medications:  Prior to Admission medications    Medication Sig Start Date End Date Taking? Authorizing Provider   amLODIPine (NORVASC) 5 MG tablet TAKE 1 TABLET BY MOUTH EVERY DAY 10/1/24   Kelley Hopper APRN   aspirin 81 MG chewable tablet Chew 1 tablet Daily. 22   Nikhil Corado MD   metoprolol tartrate " "(LOPRESSOR) 50 MG tablet TAKE 1/2 TABLET BY MOUTH 2 TIMES A DAY 10/1/24   Kelley Hopper APRN   nitroglycerin (NITROSTAT) 0.4 MG SL tablet Place 1 tablet under the tongue Every 5 (Five) Minutes As Needed for Chest Pain. Take no more than 3 doses in 15 minutes. 9/26/23   Nikhil Corado MD   prasugrel (EFFIENT) 10 MG tablet TAKE 1 TABLET BY MOUTH EVERY DAY 10/1/24   Nikhil Corado MD   rosuvastatin (CRESTOR) 20 MG tablet TAKE 1 TABLET BY MOUTH EVERY NIGHT 10/1/24   Nikhil Corado MD        Social History:   Social History     Tobacco Use    Smoking status: Every Day     Current packs/day: 1.50     Average packs/day: 1.5 packs/day for 40.0 years (60.0 ttl pk-yrs)     Types: Cigarettes    Smokeless tobacco: Never    Tobacco comments:     Smoked 1.5 to 2 packs/day for 40 years between the ages of 22 and 62 total estimate 60 pack years and still smoking up to 2 packs/day currently   Vaping Use    Vaping status: Never Used   Substance Use Topics    Alcohol use: Never    Drug use: Yes     Types: Marijuana         Review of Systems:  Review of Systems   Constitutional:  Negative for chills and fever.   Respiratory:  Negative for cough, chest tightness and shortness of breath.    Cardiovascular:  Negative for chest pain and palpitations.   Musculoskeletal:  Positive for arthralgias and joint swelling.   Skin:  Positive for rash.        Physical Exam:  /76   Pulse 59   Temp 98 °F (36.7 °C) (Oral)   Resp 13   Ht 172.7 cm (68\")   Wt 99.5 kg (219 lb 5.7 oz)   SpO2 95%   BMI 33.35 kg/m²     Physical Exam  Constitutional:       General: He is not in acute distress.     Appearance: He is not ill-appearing.   HENT:      Head: Normocephalic and atraumatic.   Eyes:      Extraocular Movements: Extraocular movements intact.      Pupils: Pupils are equal, round, and reactive to light.   Musculoskeletal:         General: Swelling and tenderness present.      Comments: Evidence of left knee erythema and " "swelling present.  Tender to palpation with guarding.  Evidence of article postsurgical scar consistent with history of total knee replacement.   Skin:     Findings: Lesion present. Rash is vesicular.      Comments: Evidence of medial right thigh vesicular rash spreading vertically up into groin and hip consistent with shingles.   Neurological:      General: No focal deficit present.      Mental Status: He is oriented to person, place, and time.      Cranial Nerves: No cranial nerve deficit.      Sensory: No sensory deficit.      Motor: No weakness.                    Medical Decision Making:      Comorbidities that affect care:    Coronary Artery Disease    External Notes reviewed:    Previous Clinic Note: Previous car urology clinic note on 5/21/2024 reviewed.      The following orders were placed and all results were independently analyzed by me:  Orders Placed This Encounter   Procedures    Blood Culture - Blood,    Blood Culture - Blood,    XR Knee 3 View Left    [place] Draw    Comprehensive Metabolic Panel    Urine Drug Screen - Urine, Clean Catch    CBC Auto Differential    Ethanol    Urinalysis With Culture If Indicated - Urine, Clean Catch    Lactic Acid, Plasma    Fentanyl, Urine - Urine, Clean Catch    Ambulatory Referral to Orthopedic Surgery    ECG 12 Lead Rhythm Change    ECG 12 Lead    CBC & Differential    Green Top (Gel)    Lavender Top    Gold Top - SST    Light Blue Top       Medications Given in the Emergency Department:  Medications   morphine injection 4 mg (4 mg Intravenous Given 12/11/24 2038)   ondansetron (ZOFRAN) injection 4 mg (4 mg Intravenous Given 12/11/24 2038)   HYDROcodone-acetaminophen (NORCO) 7.5-325 MG per tablet 1 tablet (1 tablet Oral Given 12/11/24 2159)        ED Course:    ED Course as of 12/11/24 2244   Wed Dec 11, 2024   2018 Vital signs reviewed.  No acute findings.  Patient reports pain of 10 out of 10 \"everywhere.  Evidence of right medial thigh radiating to right " groin shingles rash present along with erythematous and swollen left knee status post knee replacement surgery in November as possible causes of pain.  Will administer Zofran and morphine for pain management nausea.  Pain of 10 out of 10 out of proportion to physical exam findings including normotensive, sinus rhythm/bradycardia.  [CB]   2044 Knee x-ray left reviewed.  Reading reveals evidence of small suprapatellar joint effusion with evidence of prepatellar edema. [CB]   2045 EKG reviewed. [CB]   2147 Lactate within normal limits.  CBC within normal limits.  UA within normal limits.  CMP within normal limits. [CB]   2148 Low suspicion for acute septic arthritis, bacteremia, osteomyelitis or deep tissue infection based on findings today.  Findings today are consistent with postop pain related to left knee placement along with new shingles outbreak.  Blood cultures drawn today prior to discharge.  Discussed with patient to continue outpatient cream prescribed by dermatologist for ongoing shingles management.  Discussed follow-up with orthopedic surgeon for ongoing management pain regimen along with follow-up visit neck 7 days for ED follow-up.  Recommended follow-up with PCP in the next 7 days.  Strict return precautions provided at bedside.  Patient and patient's wife voiced understanding at this time you are agreeable to discharge. [CB]      ED Course User Index  [CB] Liang Bruce, EPHRAIM       Labs:    Lab Results (last 24 hours)       Procedure Component Value Units Date/Time    CBC & Differential [[id number]]  (Normal) Collected: 12/11/24 2031    Specimen: Blood Updated: 12/11/24 2041    Narrative:      The following orders were created for panel order CBC & Differential.  Procedure                               Abnormality         Status                     ---------                               -----------         ------                     CBC Auto Differential[[id number]]        Normal              Final  result                 Please view results for these tests on the individual orders.    Comprehensive Metabolic Panel [[id number]]  (Abnormal) Collected: 12/11/24 2031    Specimen: Blood Updated: 12/11/24 2057     Glucose 88 mg/dL      BUN 14 mg/dL      Creatinine 0.90 mg/dL      Sodium 135 mmol/L      Potassium 4.5 mmol/L      Comment: Slight hemolysis detected by analyzer. Result may be falsely elevated.        Chloride 100 mmol/L      CO2 23.7 mmol/L      Calcium 9.2 mg/dL      Total Protein 7.8 g/dL      Albumin 3.9 g/dL      ALT (SGPT) 11 U/L      AST (SGOT) 17 U/L      Alkaline Phosphatase 110 U/L      Total Bilirubin 0.6 mg/dL      Globulin 3.9 gm/dL      A/G Ratio 1.0 g/dL      BUN/Creatinine Ratio 15.6     Anion Gap 11.3 mmol/L      eGFR 94.8 mL/min/1.73     Narrative:      GFR Categories in Chronic Kidney Disease (CKD)      GFR Category          GFR (mL/min/1.73)    Interpretation  G1                     90 or greater         Normal or high (1)  G2                      60-89                Mild decrease (1)  G3a                   45-59                Mild to moderate decrease  G3b                   30-44                Moderate to severe decrease  G4                    15-29                Severe decrease  G5                    14 or less           Kidney failure          (1)In the absence of evidence of kidney disease, neither GFR category G1 or G2 fulfill the criteria for CKD.    eGFR calculation 2021 CKD-EPI creatinine equation, which does not include race as a factor    CBC Auto Differential [[id number]]  (Normal) Collected: 12/11/24 2031    Specimen: Blood Updated: 12/11/24 2041     WBC 7.87 10*3/mm3      RBC 4.84 10*6/mm3      Hemoglobin 14.2 g/dL      Hematocrit 43.3 %      MCV 89.5 fL      MCH 29.3 pg      MCHC 32.8 g/dL      RDW 13.9 %      RDW-SD 45.3 fl      MPV 9.9 fL      Platelets 379 10*3/mm3      Neutrophil % 56.4 %      Lymphocyte % 27.3 %      Monocyte % 11.3 %      Eosinophil % 3.6 %       Basophil % 1.3 %      Immature Grans % 0.1 %      Neutrophils, Absolute 4.44 10*3/mm3      Lymphocytes, Absolute 2.15 10*3/mm3      Monocytes, Absolute 0.89 10*3/mm3      Eosinophils, Absolute 0.28 10*3/mm3      Basophils, Absolute 0.10 10*3/mm3      Immature Grans, Absolute 0.01 10*3/mm3      nRBC 0.0 /100 WBC     Ethanol [[id number]] Collected: 12/11/24 2031    Specimen: Blood Updated: 12/11/24 2057     Ethanol <10 mg/dL      Ethanol % <0.010 %     Narrative:      Ethanol (Plasma)  <10 Essentially Negative    Toxic Concentrations           mg/dL    Flushing, slowing of reflexes    Impaired visual activity         Depression of CNS              >100  Possible Coma                  >300       Lactic Acid, Plasma [[id number]]  (Normal) Collected: 12/11/24 2031    Specimen: Blood Updated: 12/11/24 2053     Lactate 1.2 mmol/L     Urine Drug Screen - Urine, Clean Catch [[id number]]  (Abnormal) Collected: 12/11/24 2046    Specimen: Urine, Clean Catch Updated: 12/11/24 2109     THC, Screen, Urine Positive     Phencyclidine (PCP), Urine Negative     Cocaine Screen, Urine Negative     Methamphetamine, Ur Negative     Opiate Screen Negative     Amphetamine Screen, Urine Negative     Benzodiazepine Screen, Urine Negative     Tricyclic Antidepressants Screen Negative     Methadone Screen, Urine Negative     Barbiturates Screen, Urine Negative     Oxycodone Screen, Urine Positive     Buprenorphine, Screen, Urine Negative    Narrative:      Cutoff For Drugs Screened:    Amphetamines               500 ng/ml  Barbiturates               200 ng/ml  Benzodiazepines            150 ng/ml  Cocaine                    150 ng/ml  Methadone                  200 ng/ml  Opiates                    100 ng/ml  Phencyclidine               25 ng/ml  THC                         50 ng/ml  Methamphetamine            500 ng/ml  Tricyclic Antidepressants  300 ng/ml  Oxycodone                  100 ng/ml  Buprenorphine               10  ng/ml    The normal value for all drugs tested is negative. This report includes unconfirmed screening results, with the cutoff values listed, to be used for medical treatment purposes only.  Unconfirmed results must not be used for non-medical purposes such as employment or legal testing.  Clinical consideration should be applied to any drug of abuse test, particularly when unconfirmed results are used.      Urinalysis With Culture If Indicated - Urine, Clean Catch [[id number]]  (Normal) Collected: 12/11/24 2046    Specimen: Urine, Clean Catch Updated: 12/11/24 2102     Color, UA Yellow     Appearance, UA Clear     pH, UA 5.5     Specific Gravity, UA 1.018     Glucose, UA Negative     Ketones, UA Negative     Bilirubin, UA Negative     Blood, UA Negative     Protein, UA Negative     Leuk Esterase, UA Negative     Nitrite, UA Negative     Urobilinogen, UA 0.2 E.U./dL    Narrative:      In absence of clinical symptoms, the presence of pyuria, bacteria, and/or nitrites on the urinalysis result does not correlate with infection.  Urine microscopic not indicated.    Fentanyl, Urine - Urine, Clean Catch [[id number]]  (Normal) Collected: 12/11/24 2046    Specimen: Urine, Clean Catch Updated: 12/11/24 2113     Fentanyl, Urine Negative    Narrative:      Negative Threshold:      Fentanyl 5 ng/mL     The normal value for the drug tested is negative. This report includes final unconfirmed screening results to be used for medical treatment purposes only. Unconfirmed results must not be used for non-medical purposes such as employment or legal testing. Clinical consideration should be applied to any drug of abuse test, particularly when unconfirmed results are used.           Blood Culture - Blood, Arm, Left [[id number]] Collected: 12/11/24 2203    Specimen: Blood from Arm, Left Updated: 12/11/24 2207    Blood Culture - Blood, Arm, Right [[id number]] Collected: 12/11/24 2203    Specimen: Blood from Arm, Right Updated: 12/11/24 2208              Imaging:    XR Knee 3 View Left    Result Date: 12/11/2024  XR KNEE 3 VW LEFT Date of Exam: 12/11/2024 7:52 PM EST Indication: left knee pain s/p surgery x 1 wk Comparison: None available. Findings: No evidence of acute fracture nor dislocation. Alignment is normal. Left knee arthroplasty. There is a small suprapatellar joint effusion. There is prepatellar edema. There are vascular calcifications.     Impression: 1.Joint effusion and prepatellar edema. No acute osseous process identified. Electronically Signed: Luis Fernando Marks MD  12/11/2024 8:24 PM EST  Workstation ID: [id number]       Differential Diagnosis and Discussion:    Joint Pain: Differential diagnosis includes but is not limited to polyarticular arthritis, gout, tendinitis, hemarthrosis, septic arthritis, rheumatoid arthritis, bursitis, degenerative joint disease, joint effusion, autoimmune disorder, trauma, and occult neoplasm.  Rash: Differential diagnosis includes but is not limited to sepsis, cellulitis, [place] Spotted Fever, meningitis, meningococcemia, Varicella, Strep infection, dermatitis, allergic reaction, Lyme disease, and toxic shock syndrome.    PROCEDURES:    Labs were drawn in the emergency department and all labs were reviewed and interpreted by me.    ECG 12 Lead Rhythm Change   Preliminary Result   HEART RATE=58  bpm   RR Cpznqjfn=8050  ms   MN Dorncbus=498  ms   P Horizontal Axis=-21  deg   P Front Axis=11  deg   QRSD Mrjjijxb=602  ms   QT Nnbrcsfg=049  ms   TKyF=357  ms   QRS Axis=-29  deg   T Wave Axis=12  deg   - ABNORMAL ECG -   Sinus rhythm   Ventricular premature complex   RSR' in V1 or V2, probably normal variant   Inferior infarct, old   Date and Time of Study:2024-12-11 20:26:07      ECG 12 Lead    (Results Pending)       ECG 12 Lead      Date/Time: 12/11/2024 10:39 PM    Performed by: Liang Bruce PA-C  Authorized by: Solitario Urena MD  Comparison: compared with previous ECG from 3/16/2022  Similar  to previous ECG  Rhythm: sinus bradycardia  Conduction: conduction normal  ST Segments: ST segments normal  Other: no other findings        MDM                     Patient Care Considerations:    SEPSIS was considered but is NOT present in the emergency department as SIRS criteria is not present.      Consultants/Shared Management Plan:    None    Social Determinants of Health:    Patient has presented with family members who are responsible, reliable and will ensure follow up care.      Disposition and Care Coordination:    Discharged: The patient is suitable and stable for discharge with no need for consideration of admission.    I have explained the patient´s condition, diagnoses and treatment plan based on the information available to me at this time. I have answered questions and addressed any concerns. The patient has a good  understanding of the patient´s diagnosis, condition, and treatment plan as can be expected at this point. The vital signs have been stable. The patient´s condition is stable and appropriate for discharge from the emergency department.      The patient will pursue further outpatient evaluation with the primary care physician or other designated or consulting physician as outlined in the discharge instructions. They are agreeable to this plan of care and follow-up instructions have been explained in detail. The patient has received these instructions in written format and has expressed an understanding of the discharge instructions. The patient is aware that any significant change in condition or worsening of symptoms should prompt an immediate return to this or the closest emergency department or call to 911.  I have explained discharge medications and the need for follow up with the patient/caretakers. This was also printed in the discharge instructions. Patient was discharged with the following medications and follow up:      Medication List      No changes were made to your  prescriptions during this visit.      Provider, No Known  [facility]  [place] [address]    Schedule an appointment as soon as possible for a visit in 7 days      Brent Wheeler MD  [address] [facility] [address] [phone]             Final diagnoses:   Thigh shingles   Effusion of left knee        ED Disposition       ED Disposition   Discharge    Condition   Stable    Comment   --               This medical record created using voice recognition software.             Liang Bruce, PAJustinC  12/11/24 8627

## 2024-12-16 LAB
BACTERIA SPEC AEROBE CULT: NORMAL
BACTERIA SPEC AEROBE CULT: NORMAL

## 2024-12-20 ENCOUNTER — LAB (OUTPATIENT)
Dept: LAB | Facility: HOSPITAL | Age: 64
End: 2024-12-20
Payer: COMMERCIAL

## 2024-12-20 DIAGNOSIS — E78.2 HYPERLIPEMIA, MIXED: ICD-10-CM

## 2024-12-20 LAB
ALBUMIN SERPL-MCNC: 4 G/DL (ref 3.5–5.2)
ALP SERPL-CCNC: 101 U/L (ref 39–117)
ALT SERPL W P-5'-P-CCNC: 16 U/L (ref 1–41)
AST SERPL-CCNC: 18 U/L (ref 1–40)
BILIRUB CONJ SERPL-MCNC: <0.2 MG/DL (ref 0–0.3)
BILIRUB INDIRECT SERPL-MCNC: NORMAL MG/DL
BILIRUB SERPL-MCNC: 0.8 MG/DL (ref 0–1.2)
CHOLEST SERPL-MCNC: 131 MG/DL (ref 0–200)
HDLC SERPL-MCNC: 33 MG/DL (ref 40–60)
LDLC SERPL CALC-MCNC: 80 MG/DL (ref 0–100)
LDLC/HDLC SERPL: 2.41 {RATIO}
PROT SERPL-MCNC: 7.4 G/DL (ref 6–8.5)
TRIGL SERPL-MCNC: 93 MG/DL (ref 0–150)
VLDLC SERPL-MCNC: 18 MG/DL (ref 5–40)

## 2024-12-20 PROCEDURE — 36415 COLL VENOUS BLD VENIPUNCTURE: CPT

## 2024-12-20 PROCEDURE — 80076 HEPATIC FUNCTION PANEL: CPT

## 2024-12-20 PROCEDURE — 80061 LIPID PANEL: CPT

## 2024-12-25 NOTE — PROGRESS NOTES
[facility]  Cardiology progress Note    Patient Name: Marquis Pool  : 1960    CHIEF COMPLAINT  CAD        Subjective   Subjective     HISTORY OF PRESENT ILLNESS    Marquis Pool is a 64 y.o. male with CAD status post PTCA/stent.  No chest pain.  Recently in the hospital emergency room with knee pain.  Questionable noticed to have atrial fibrillation.  EKG however showed sinus rhythm.    REVIEW OF SYSTEMS    Constitutional:    No fever, no weight loss  Skin:     No rash  Otolaryngeal:    No difficulty swallowing  Cardiovascular: See HPI.  Pulmonary:    No cough, no sputum production    Personal History     Social History:    reports that he has been smoking cigarettes. He has a 60 pack-year smoking history. He has never used smokeless tobacco. He reports current drug use. Drug: Marijuana. He reports that he does not drink alcohol.    Home Medications:  Current Outpatient Medications on File Prior to Visit   Medication Sig    acetaminophen (TYLENOL) 325 MG tablet 1 tablet.    amLODIPine (NORVASC) 5 MG tablet TAKE 1 TABLET BY MOUTH EVERY DAY    aspirin 81 MG chewable tablet Chew 1 tablet Daily.    Cholecalciferol (Vitamin D3) 1.25 MG (28969 UT) capsule Take 1 capsule by mouth 1 (One) Time Per Week.    gabapentin (NEURONTIN) 300 MG capsule 1 capsule.    metoprolol tartrate (LOPRESSOR) 50 MG tablet TAKE 1/2 TABLET BY MOUTH 2 TIMES A DAY    nitroglycerin (NITROSTAT) 0.4 MG SL tablet Place 1 tablet under the tongue Every 5 (Five) Minutes As Needed for Chest Pain. Take no more than 3 doses in 15 minutes.    oxyCODONE (ROXICODONE) 5 MG immediate release tablet 1 tablet.    prasugrel (EFFIENT) 10 MG tablet TAKE 1 TABLET BY MOUTH EVERY DAY    rosuvastatin (CRESTOR) 20 MG tablet TAKE 1 TABLET BY MOUTH EVERY NIGHT    traMADol (ULTRAM) 50 MG tablet 1 tablet.    valACYclovir (VALTREX) 1000 MG tablet 1 tablet.    [DISCONTINUED] celecoxib (CeleBREX) 400 MG capsule 1 capsule.    [DISCONTINUED] docusate sodium  (COLACE) 100 MG capsule Take 1 capsule by mouth Every 12 (Twelve) Hours.     No current facility-administered medications on file prior to visit.       Past Medical History:   Diagnosis Date    CAD S/P percutaneous coronary angioplasty 03/21/2022    CVA 03/21/2022    Essential hypertension 03/21/2022    Hyperlipidemia LDL goal <70 03/21/2022    Myocardial infarction     Smoker 03/21/2022    TIA (transient ischemic attack)        Allergies:  No Known Allergies    Objective    Objective       Vitals:   Heart Rate:  [61-68] 61  BP: (151-162)/(94-97) 162/97  Body mass index is 34.21 kg/m².     PHYSICAL EXAM:    General Appearance:   well developed  well nourished  HENT:   oropharynx moist  lips not cyanotic  Neck:  thyroid not enlarged  supple  Respiratory:  no respiratory distress  normal breath sounds  no rales  Cardiovascular:  no jugular venous distention  regular rhythm  apical impulse normal  S1 normal, S2 normal  no S3, no S4   no murmur  no rub, no thrill  carotid pulses normal; no bruit  pedal pulses normal  lower extremity edema: none    Skin:   warm, dry  Psychiatric:  judgement and insight appropriate  normal mood and affect        Result Review:  I have personally reviewed the available results from  [x]  Laboratory  [x]  EKG  [x]  Cardiology  [x]  Medications  [x]  Old records  []  Other:     Procedures  Lab Results   Component Value Date    CHOL 131 12/20/2024    CHOL 167 05/17/2024     Lab Results   Component Value Date    TRIG 93 12/20/2024    TRIG 155 (H) 05/17/2024     Lab Results   Component Value Date    HDL 33 (L) 12/20/2024    HDL 42 05/17/2024     Lab Results   Component Value Date    LDL 80 12/20/2024    LDL 98 05/17/2024     Lab Results   Component Value Date    VLDL 18 12/20/2024    VLDL 27 05/17/2024     Results for orders placed in visit on 05/05/22    Adult Transthoracic Echo Complete W/ Cont if Necessary Per Protocol    Interpretation Summary  Normal left ventricular systolic  function.  Trace MR and trace TR     Impression/Plan:  1.  Essential hypertension controlled: Continue amlodipine 5 mg once a day.  Monitor blood pressure regularly.  2.  Mixed hyperlipidemia: Continue Crestor 20 mg once a day.  Compliance with medication advised .monitor lipid and hepatic profile.  3.  CAD s/p PTCA/stent: Continue Effient 10 mg once a day.  Continue aspirin 81 mg once a day.  Continue Toprol-XL 50 mg once a day.  No chest pain.  Sestamibi stress test.  4.  Positive for smoking: Smoking cessation discussed with patient.  5.  Questionable atrial fibrillation: 48-hour Holter monitoring.           Nikhil Corado MD   12/27/24   10:32 EST

## 2024-12-27 ENCOUNTER — OFFICE VISIT (OUTPATIENT)
Dept: CARDIOLOGY | Facility: CLINIC | Age: 64
End: 2024-12-27
Payer: COMMERCIAL

## 2024-12-27 VITALS
SYSTOLIC BLOOD PRESSURE: 162 MMHG | DIASTOLIC BLOOD PRESSURE: 97 MMHG | HEART RATE: 61 BPM | HEIGHT: 68 IN | BODY MASS INDEX: 34.1 KG/M2 | WEIGHT: 225 LBS

## 2024-12-27 DIAGNOSIS — Z72.0 NICOTINE USE: ICD-10-CM

## 2024-12-27 DIAGNOSIS — I48.0 PAROXYSMAL ATRIAL FIBRILLATION: ICD-10-CM

## 2024-12-27 DIAGNOSIS — I25.10 CORONARY ARTERY DISEASE INVOLVING NATIVE CORONARY ARTERY OF NATIVE HEART WITHOUT ANGINA PECTORIS: ICD-10-CM

## 2024-12-27 DIAGNOSIS — I10 HYPERTENSION, ESSENTIAL: Primary | ICD-10-CM

## 2024-12-27 DIAGNOSIS — Z95.5 HISTORY OF CORONARY ANGIOPLASTY WITH INSERTION OF STENT: ICD-10-CM

## 2024-12-27 DIAGNOSIS — E78.2 HYPERLIPEMIA, MIXED: ICD-10-CM

## 2024-12-27 PROCEDURE — 99214 OFFICE O/P EST MOD 30 MIN: CPT | Performed by: SPECIALIST

## 2024-12-28 LAB
QT INTERVAL: 449 MS
QTC INTERVAL: 443 MS

## 2025-01-10 ENCOUNTER — TELEPHONE (OUTPATIENT)
Dept: CARDIOLOGY | Facility: CLINIC | Age: 65
End: 2025-01-10
Payer: COMMERCIAL

## 2025-01-10 NOTE — TELEPHONE ENCOUNTER
Caller: Marquis Pool    Relationship: Self    Best call back number: [phone]     What is the best time to reach you: ANY    Who are you requesting to speak with (clinical staff, provider,  specific staff member): ANY    Do you know the name of the person who called:     What was the call regarding: PATIENT JUST LETTING EVERYONE KNOW THAT THE HOLTER WILL BE RETURNED ON MONDAY DUE TO THE WEATHER TODAY. IF ANY QUESTIONS PLEASE CALL PATIENT. THANK YOU!    Is it okay if the provider responds through [employer]: NO

## 2025-01-10 NOTE — TELEPHONE ENCOUNTER
----- Message from Kelley Hopper sent at 1/10/2025 11:41 AM EST -----  Holter monitor shows normal sinus rhythm with average heart rate 74  There is one brief episode of SVT, continue current dose of metoprolol and reduce caffeine intake. Follow up as scheduled

## 2025-04-01 ENCOUNTER — HOSPITAL ENCOUNTER (OUTPATIENT)
Facility: HOSPITAL | Age: 65
Discharge: HOME | End: 2025-04-01
Admitting: SPECIALIST
Payer: COMMERCIAL

## 2025-04-01 DIAGNOSIS — I25.10 CORONARY ARTERY DISEASE INVOLVING NATIVE CORONARY ARTERY OF NATIVE HEART WITHOUT ANGINA PECTORIS: ICD-10-CM

## 2025-04-01 LAB
BH CV IMMEDIATE POST RECOVERY TECH DATA SYMPTOMS: NORMAL
BH CV IMMEDIATE POST TECH DATA BLOOD PRESSURE: NORMAL MMHG
BH CV IMMEDIATE POST TECH DATA HEART RATE: 66 BPM
BH CV IMMEDIATE POST TECH DATA OXYGEN SATS: 95 %
BH CV REST NUCLEAR ISOTOPE DOSE: 9.8 MCI
BH CV SIX MINUTE RECOVERY TECH DATA BLOOD PRESSURE: NORMAL
BH CV SIX MINUTE RECOVERY TECH DATA HEART RATE: 63 BPM
BH CV SIX MINUTE RECOVERY TECH DATA OXYGEN SATURATION: 96 %
BH CV SIX MINUTE RECOVERY TECH DATA SYMPTOMS: NORMAL
BH CV STRESS BP STAGE 1: NORMAL
BH CV STRESS COMMENTS STAGE 1: NORMAL
BH CV STRESS DOSE REGADENOSON STAGE 1: 0.4
BH CV STRESS DURATION MIN STAGE 1: 0
BH CV STRESS DURATION SEC STAGE 1: 10
BH CV STRESS HR STAGE 1: 53
BH CV STRESS NUCLEAR ISOTOPE DOSE: 33.1 MCI
BH CV STRESS O2 STAGE 1: 95
BH CV STRESS PROTOCOL 1: NORMAL
BH CV STRESS RECOVERY BP: NORMAL MMHG
BH CV STRESS RECOVERY HR: 76 BPM
BH CV STRESS RECOVERY O2: 96 %
BH CV STRESS STAGE 1: 1
BH CV THREE MINUTE POST TECH DATA BLOOD PRESSURE: NORMAL MMHG
BH CV THREE MINUTE POST TECH DATA HEART RATE: 72 BPM
BH CV THREE MINUTE POST TECH DATA OXYGEN SATURATION: 96 %
BH CV THREE MINUTE RECOVERY TECH DATA SYMPTOM: NORMAL
MAXIMAL PREDICTED HEART RATE: 154 BPM
PERCENT MAX PREDICTED HR: 49.35 %
SPECT HRT GATED+EF W RNC IV: 42 %
STRESS BASELINE BP: NORMAL MMHG
STRESS BASELINE HR: 49 BPM
STRESS O2 SAT REST: 94 %
STRESS PERCENT HR: 58 %
STRESS POST O2 SAT PEAK: 95 %
STRESS POST PEAK BP: NORMAL MMHG
STRESS POST PEAK HR: 76 BPM
STRESS TARGET HR: 131 BPM

## 2025-04-01 PROCEDURE — A9502 TC99M TETROFOSMIN: HCPCS | Performed by: SPECIALIST

## 2025-04-01 PROCEDURE — 78452 HT MUSCLE IMAGE SPECT MULT: CPT | Performed by: SPECIALIST

## 2025-04-01 PROCEDURE — 93018 CV STRESS TEST I&R ONLY: CPT | Performed by: SPECIALIST

## 2025-04-01 PROCEDURE — 78452 HT MUSCLE IMAGE SPECT MULT: CPT

## 2025-04-01 PROCEDURE — 25010000002 REGADENOSON 0.4 MG/5ML SOLUTION: Performed by: SPECIALIST

## 2025-04-01 PROCEDURE — 34310000005 TECHNETIUM TETROFOSMIN KIT: Performed by: SPECIALIST

## 2025-04-01 PROCEDURE — 93016 CV STRESS TEST SUPVJ ONLY: CPT | Performed by: NURSE PRACTITIONER

## 2025-04-01 PROCEDURE — 93017 CV STRESS TEST TRACING ONLY: CPT

## 2025-04-01 RX ORDER — REGADENOSON 0.08 MG/ML
0.4 INJECTION, SOLUTION INTRAVENOUS
Status: COMPLETED | OUTPATIENT
Start: 2025-04-01 | End: 2025-04-01

## 2025-04-01 RX ADMIN — TETROFOSMIN 1 DOSE: 1.38 INJECTION, POWDER, LYOPHILIZED, FOR SOLUTION INTRAVENOUS at 11:30

## 2025-04-01 RX ADMIN — TETROFOSMIN 1 DOSE: 1.38 INJECTION, POWDER, LYOPHILIZED, FOR SOLUTION INTRAVENOUS at 09:50

## 2025-04-01 RX ADMIN — REGADENOSON 0.4 MG: 0.08 INJECTION, SOLUTION INTRAVENOUS at 11:30

## 2025-04-02 ENCOUNTER — RESULTS FOLLOW-UP (OUTPATIENT)
Dept: CARDIOLOGY | Facility: CLINIC | Age: 65
End: 2025-04-02
Payer: COMMERCIAL

## 2025-04-02 NOTE — TELEPHONE ENCOUNTER
Tried to call patient back. [facility] again to return phone call regarding test results.

## 2025-06-26 NOTE — PROGRESS NOTES
[facility]  Cardiology progress Note    Patient Name: Marquis Pool  : 1960    CHIEF COMPLAINT  CAD        Subjective   Subjective     HISTORY OF PRESENT ILLNESS    Marquis Pool is a 65 y.o. male with history of CAD.  No chest pain.    REVIEW OF SYSTEMS    Constitutional:    No fever, no weight loss  Skin:     No rash  Otolaryngeal:    No difficulty swallowing  Cardiovascular: See HPI.  Pulmonary:    No cough, no sputum production    Personal History     Social History:    reports that he has been smoking cigarettes. He has a 60 pack-year smoking history. He has never used smokeless tobacco. He reports that he does not currently use drugs after having used the following drugs: Marijuana. He reports that he does not drink alcohol.    Home Medications:  Current Outpatient Medications on File Prior to Visit   Medication Sig    acetaminophen (TYLENOL) 325 MG tablet 1 tablet.    amLODIPine (NORVASC) 5 MG tablet TAKE 1 TABLET BY MOUTH EVERY DAY    aspirin 81 MG chewable tablet Chew 1 tablet Daily.    Cholecalciferol (Vitamin D3) 1.25 MG (72975 UT) capsule Take 1 capsule by mouth 1 (One) Time Per Week.    metoprolol tartrate (LOPRESSOR) 50 MG tablet TAKE 1/2 TABLET BY MOUTH 2 TIMES A DAY    nitroglycerin (NITROSTAT) 0.4 MG SL tablet Place 1 tablet under the tongue Every 5 (Five) Minutes As Needed for Chest Pain. Take no more than 3 doses in 15 minutes.    rosuvastatin (CRESTOR) 20 MG tablet TAKE 1 TABLET BY MOUTH EVERY NIGHT    [DISCONTINUED] prasugrel (EFFIENT) 10 MG tablet TAKE 1 TABLET BY MOUTH EVERY DAY    [DISCONTINUED] gabapentin (NEURONTIN) 300 MG capsule 1 capsule.    [DISCONTINUED] oxyCODONE (ROXICODONE) 5 MG immediate release tablet 1 tablet.    [DISCONTINUED] traMADol (ULTRAM) 50 MG tablet 1 tablet.    [DISCONTINUED] valACYclovir (VALTREX) 1000 MG tablet 1 tablet.     No current facility-administered medications on file prior to visit.       Past Medical History:   Diagnosis Date    CAD S/P  percutaneous coronary angioplasty 03/21/2022    CVA 03/21/2022    Essential hypertension 03/21/2022    Hyperlipidemia LDL goal <70 03/21/2022    Myocardial infarction     Smoker 03/21/2022    TIA (transient ischemic attack)        Allergies:  No Known Allergies    Objective    Objective       Vitals:   Heart Rate:  [83] 83  BP: (143)/(87) 143/87  Body mass index is 34.21 kg/m².     PHYSICAL EXAM:    General Appearance:   well developed  well nourished  HENT:   oropharynx moist  lips not cyanotic  Neck:  thyroid not enlarged  supple  Respiratory:  no respiratory distress  normal breath sounds  no rales  Cardiovascular:  no jugular venous distention  regular rhythm  apical impulse normal  S1 normal, S2 normal  no S3, no S4   no murmur  no rub, no thrill  carotid pulses normal; no bruit  pedal pulses normal  lower extremity edema: none    Skin:   warm, dry  Psychiatric:  judgement and insight appropriate  normal mood and affect        Result Review:  I have personally reviewed the available results from  [x]  Laboratory  [x]  EKG  [x]  Cardiology  [x]  Medications  [x]  Old records  []  Other:       ECG 12 Lead    Date/Time: 6/27/2025 11:23 AM  Performed by: Nikhil Corado MD    Authorized by: Nikhil Corado MD  Comparison: compared with previous ECG   Rhythm: sinus rhythm  Rate: normal  QRS axis: normal  Other findings: non-specific ST-T wave changes  Comments: Normal sinus rhythm.  No significant changes compared to previous EKG        Lab Results   Component Value Date    CHOL 131 12/20/2024    CHOL 167 05/17/2024     Lab Results   Component Value Date    TRIG 93 12/20/2024    TRIG 155 (H) 05/17/2024     Lab Results   Component Value Date    HDL 33 (L) 12/20/2024    HDL 42 05/17/2024     Lab Results   Component Value Date    LDL 80 12/20/2024    LDL 98 05/17/2024     Lab Results   Component Value Date    VLDL 18 12/20/2024    VLDL 27 05/17/2024     Results for orders placed in visit on 05/05/22    Adult  Transthoracic Echo Complete W/ Cont if Necessary Per Protocol    Interpretation Summary  Normal left ventricular systolic function.  Trace MR and trace TR     Impression/Plan:  1.  Essential hypertension controlled: Continue amlodipine 5 mg once a day.  Monitor blood pressure regularly.  2.  Mixed hyperlipidemia: Continue Crestor 20 mg once a day.  Compliance with medication advised .monitor lipid and hepatic profile.  3.  CAD s/p PTCA/stent Continue aspirin 81 mg once a day.  Continue Toprol-XL 50 mg once a day.  No chest pain.  Stress test negative for any significant ischemia  4.  Positive for smoking: Smoking cessation discussed with patient.  5.  PSVT/paroxysmal atrial fibrillation: Continue metoprolol 50 mg once a day.  Discontinue Effient.  Start Eliquis 5 mg twice a day for stroke prevention.  Risk benefits of Eliquis discussed the patient.  Risk of bleeding/intracerebral bleed GI bleed discussed with patient.  Recent stress test shows a small fixed defect              Nikhil Corado MD   06/27/25   11:22 EDT

## 2025-06-27 ENCOUNTER — OFFICE VISIT (OUTPATIENT)
Dept: CARDIOLOGY | Facility: CLINIC | Age: 65
End: 2025-06-27
Payer: COMMERCIAL

## 2025-06-27 VITALS
HEART RATE: 83 BPM | SYSTOLIC BLOOD PRESSURE: 143 MMHG | HEIGHT: 68 IN | BODY MASS INDEX: 34.1 KG/M2 | DIASTOLIC BLOOD PRESSURE: 87 MMHG | WEIGHT: 225 LBS

## 2025-06-27 DIAGNOSIS — E78.2 HYPERLIPEMIA, MIXED: ICD-10-CM

## 2025-06-27 DIAGNOSIS — Z95.5 HISTORY OF CORONARY ANGIOPLASTY WITH INSERTION OF STENT: ICD-10-CM

## 2025-06-27 DIAGNOSIS — Z72.0 NICOTINE USE: ICD-10-CM

## 2025-06-27 DIAGNOSIS — I47.10 PSVT (PAROXYSMAL SUPRAVENTRICULAR TACHYCARDIA): ICD-10-CM

## 2025-06-27 DIAGNOSIS — I25.10 CORONARY ARTERY DISEASE INVOLVING NATIVE CORONARY ARTERY OF NATIVE HEART WITHOUT ANGINA PECTORIS: Primary | ICD-10-CM

## 2025-06-27 PROCEDURE — 93000 ELECTROCARDIOGRAM COMPLETE: CPT | Performed by: SPECIALIST

## 2025-06-27 PROCEDURE — 99214 OFFICE O/P EST MOD 30 MIN: CPT | Performed by: SPECIALIST

## 2025-07-28 ENCOUNTER — APPOINTMENT (OUTPATIENT)
Dept: RADIOLOGY | Facility: HOSPITAL | Age: 65
End: 2025-07-28
Payer: COMMERCIAL

## 2025-07-28 ENCOUNTER — APPOINTMENT (OUTPATIENT)
Dept: CT IMAGING | Facility: HOSPITAL | Age: 65
End: 2025-07-28
Payer: COMMERCIAL

## 2025-07-28 ENCOUNTER — HOSPITAL ENCOUNTER (EMERGENCY)
Facility: HOSPITAL | Age: 65
Discharge: HOME | End: 2025-07-28
Attending: FAMILY MEDICINE
Payer: COMMERCIAL

## 2025-07-28 VITALS
RESPIRATION RATE: 15 BRPM | OXYGEN SATURATION: 90 % | WEIGHT: 234.1 LBS | HEIGHT: 69 IN | HEART RATE: 61 BPM | SYSTOLIC BLOOD PRESSURE: 119 MMHG | TEMPERATURE: 98.1 F | DIASTOLIC BLOOD PRESSURE: 76 MMHG | BODY MASS INDEX: 34.67 KG/M2

## 2025-07-28 DIAGNOSIS — R09.02 HYPOXIA: ICD-10-CM

## 2025-07-28 DIAGNOSIS — J18.9 ATYPICAL PNEUMONIA: Primary | ICD-10-CM

## 2025-07-28 LAB
ALBUMIN SERPL-MCNC: 3.5 G/DL (ref 3.5–5.3)
ALP SERPL-CCNC: 67 U/L (ref 45–115)
ALT SERPL-CCNC: 16 U/L (ref 7–52)
AMPHET UR QL SCN: ABNORMAL
ANION GAP SERPL CALC-SCNC: 10 MMOL/L (ref 3–11)
APTT PPP: 28.9 SECONDS (ref 25.1–36.5)
AST SERPL-CCNC: 16 U/L
BACTERIA #/AREA URNS HPF: NORMAL /HPF
BARBITURATES UR QL SCN: ABNORMAL
BASOPHILS # BLD AUTO: 0.06 10*3/UL
BASOPHILS NFR BLD AUTO: 0.5 % (ref 0–2)
BENZODIAZ UR QL SCN: ABNORMAL
BILIRUB SERPL-MCNC: 0.65 MG/DL (ref 0.2–1.4)
BILIRUB UR QL: NEGATIVE
BNP SERPL-MCNC: 58 PG/ML (ref 0–100)
BUN SERPL-MCNC: 12 MG/DL (ref 7–25)
CALCIUM ALBUM COR SERPL-MCNC: 8.4 MG/DL (ref 8.6–10.3)
CALCIUM SERPL-MCNC: 8 MG/DL (ref 8.6–10.3)
CANNABINOIDS UR QL SCN: POSITIVE
CHLORIDE SERPL-SCNC: 105 MMOL/L (ref 98–107)
CLARITY UR: CLEAR
CO2 SERPL-SCNC: 23 MMOL/L (ref 21–32)
COCAINE UR QL SCN: ABNORMAL
COLOR UR: YELLOW
CREAT SERPL-MCNC: 0.88 MG/DL (ref 0.7–1.3)
DELTA HIGH SENSITIVITY TROPONIN I, 1 HOUR: 2.7 (ref -15–15)
EGFRCR SERPLBLD CKD-EPI 2021: 95 ML/MIN/1.73M*2
EOSINOPHIL # BLD AUTO: 0.1 10*3/UL
EOSINOPHIL NFR BLD AUTO: 0.8 % (ref 0–3)
ERYTHROCYTE DISTRIBUTION WIDTH STANDARD DEVIATION: 48.2 FL (ref 35.1–43.9)
ERYTHROCYTE [DISTWIDTH] IN BLOOD BY AUTOMATED COUNT: 14.3 % (ref 11.5–15)
ETHANOL SERPL-MCNC: <10 MG/DL (ref 0–10)
FIBRIN D-DIMER (NG/ML) IN PLATELET POOR PLASMA: 653 NG/MLFEU
GLUCOSE BLDC GLUCOMTR-MCNC: 137 MG/DL (ref 70–105)
GLUCOSE SERPL-MCNC: 114 MG/DL (ref 70–105)
GLUCOSE UR QL: NEGATIVE MG/DL
HCT VFR BLD AUTO: 41.1 % (ref 38–50)
HGB BLD-MCNC: 13.5 G/DL (ref 13.2–17.2)
HGB UR QL: NEGATIVE
IMMATURE GRANULOCYTES (10*3/UL) LEUKOCYTES IN BLOOD BY AUTOMATED COUNT: 0.04 10*3/UL
IMMATURE GRANULOCYTES/100 LEUKOCYTES IN BLOOD BY AUTOMATED COUNT: 0.3 %
INR BLD: 1.1
KETONES UR-MCNC: NEGATIVE MG/DL
LEUKOCYTE ESTERASE UR QL STRIP: NEGATIVE
LYMPHOCYTES # BLD AUTO: 1.18 10*3/UL
LYMPHOCYTES NFR BLD AUTO: 8.9 % (ref 15–47)
MAGNESIUM SERPL-MCNC: 1.8 MG/DL (ref 1.8–2.4)
MCH RBC QN AUTO: 30 PG (ref 29–34)
MCHC RBC AUTO-ENTMCNC: 32.8 G/DL (ref 32–36)
MCV RBC AUTO: 91.3 FL (ref 82–97)
METHADONE UR QL SCN: ABNORMAL
METHAMPHET UR QL SCN: ABNORMAL
MONOCYTES # BLD AUTO: 0.99 10*3/UL
MONOCYTES NFR BLD AUTO: 7.5 % (ref 5–13)
NEUTROPHILS # BLD AUTO: 10.91 10*3/UL
NEUTROPHILS NFR BLD AUTO: 82 % (ref 46–70)
NITRITE UR QL: NEGATIVE
NRBC (10*3/UL) BY AUTOMATED COUNT: 0 10*3/UL (ref 0–0.1)
NRBC BLD-RTO: 0 /100 WBCS (ref 0–2)
OPIATES UR QL SCN: ABNORMAL
OXYCODONE UR QL SCN: ABNORMAL
PCP UR QL SCN: ABNORMAL
PH UR: 6.5 PH (ref 5–8)
PLATELET # BLD AUTO: 239 10*3/UL (ref 130–350)
PMV BLD AUTO: 9.8 FL (ref 6.9–10.8)
POTASSIUM SERPL-SCNC: 3.9 MMOL/L (ref 3.5–5.1)
PROT SERPL-MCNC: 6 G/DL (ref 6–8.3)
PROT UR STRIP-MCNC: NEGATIVE MG/DL
PROTHROMBIN TIME: 12.3 SECONDS (ref 9.4–12.5)
RBC # BLD AUTO: 4.5 10*6/UL (ref 4.1–5.8)
RBC #/AREA URNS HPF: NORMAL /HPF
SODIUM SERPL-SCNC: 138 MMOL/L (ref 135–145)
SP GR UR: 1.01 (ref 1–1.03)
SQUAMOUS #/AREA URNS HPF: NORMAL /HPF
TRICYCLICS UR QL SCN: ABNORMAL
TROPONIN I SERPL-MCNC: 31.2 PG/ML
TROPONIN I SERPL-MCNC: 33.9 PG/ML
TSH SERPL DL<=0.05 MIU/L-ACNC: 2.96 UIU/ML (ref 0.34–4.82)
URN FENTAYL INTERP: NEGATIVE
UROBILINOGEN UR-MCNC: 1 MG/DL
WBC # BLD AUTO: 13.3 10*3/UL (ref 3.7–9.6)
WBC #/AREA URNS HPF: NORMAL /HPF

## 2025-07-28 PROCEDURE — 83735 ASSAY OF MAGNESIUM: CPT | Performed by: FAMILY MEDICINE

## 2025-07-28 PROCEDURE — 82947 ASSAY GLUCOSE BLOOD QUANT: CPT | Mod: QW,91

## 2025-07-28 PROCEDURE — 82077 ASSAY SPEC XCP UR&BREATH IA: CPT | Performed by: FAMILY MEDICINE

## 2025-07-28 PROCEDURE — 36415 COLL VENOUS BLD VENIPUNCTURE: CPT | Performed by: FAMILY MEDICINE

## 2025-07-28 PROCEDURE — 71045 X-RAY EXAM CHEST 1 VIEW: CPT

## 2025-07-28 PROCEDURE — 85730 THROMBOPLASTIN TIME PARTIAL: CPT | Performed by: FAMILY MEDICINE

## 2025-07-28 PROCEDURE — 85610 PROTHROMBIN TIME: CPT | Performed by: FAMILY MEDICINE

## 2025-07-28 PROCEDURE — 70498 CT ANGIOGRAPHY NECK: CPT

## 2025-07-28 PROCEDURE — 2550000100 HC RX 255: Performed by: FAMILY MEDICINE

## 2025-07-28 PROCEDURE — 84484 ASSAY OF TROPONIN QUANT: CPT | Performed by: FAMILY MEDICINE

## 2025-07-28 PROCEDURE — 81001 URINALYSIS AUTO W/SCOPE: CPT | Mod: 59 | Performed by: FAMILY MEDICINE

## 2025-07-28 PROCEDURE — 71275 CT ANGIOGRAPHY CHEST: CPT

## 2025-07-28 PROCEDURE — 84443 ASSAY THYROID STIM HORMONE: CPT | Performed by: FAMILY MEDICINE

## 2025-07-28 PROCEDURE — 80305 DRUG TEST PRSMV DIR OPT OBS: CPT | Mod: NCP | Performed by: FAMILY MEDICINE

## 2025-07-28 PROCEDURE — 70450 CT HEAD/BRAIN W/O DYE: CPT | Mod: NCP

## 2025-07-28 PROCEDURE — 99285 EMERGENCY DEPT VISIT HI MDM: CPT | Mod: 25 | Performed by: FAMILY MEDICINE

## 2025-07-28 PROCEDURE — 80053 COMPREHEN METABOLIC PANEL: CPT | Performed by: FAMILY MEDICINE

## 2025-07-28 PROCEDURE — 85379 FIBRIN DEGRADATION QUANT: CPT | Performed by: FAMILY MEDICINE

## 2025-07-28 PROCEDURE — 93005 ELECTROCARDIOGRAM TRACING: CPT | Performed by: FAMILY MEDICINE

## 2025-07-28 PROCEDURE — 51798 US URINE CAPACITY MEASURE: CPT

## 2025-07-28 PROCEDURE — 85025 COMPLETE CBC W/AUTO DIFF WBC: CPT | Performed by: FAMILY MEDICINE

## 2025-07-28 PROCEDURE — 80306 DRUG TEST PRSMV INSTRMNT: CPT | Mod: 59 | Performed by: FAMILY MEDICINE

## 2025-07-28 PROCEDURE — 99284 EMERGENCY DEPT VISIT MOD MDM: CPT | Performed by: FAMILY MEDICINE

## 2025-07-28 PROCEDURE — 83880 ASSAY OF NATRIURETIC PEPTIDE: CPT | Performed by: FAMILY MEDICINE

## 2025-07-28 RX ORDER — AMOXICILLIN 500 MG/1
1 CAPSULE ORAL ONCE
Status: COMPLETED | OUTPATIENT
Start: 2025-07-28 | End: 2025-07-28

## 2025-07-28 RX ORDER — IOPAMIDOL 755 MG/ML
80 INJECTION, SOLUTION INTRAVASCULAR ONCE
Status: COMPLETED | OUTPATIENT
Start: 2025-07-28 | End: 2025-07-28

## 2025-07-28 RX ORDER — SODIUM CHLORIDE 9 MG/ML
25-50 INJECTION, SOLUTION INTRAVENOUS AS NEEDED
Status: DISCONTINUED | OUTPATIENT
Start: 2025-07-28 | End: 2025-07-28 | Stop reason: HOSPADM

## 2025-07-28 RX ORDER — AZITHROMYCIN 250 MG
1 TABLET ORAL ONCE
Status: COMPLETED | OUTPATIENT
Start: 2025-07-28 | End: 2025-07-28

## 2025-07-28 RX ADMIN — IOPAMIDOL 80 ML: 755 INJECTION, SOLUTION INTRAVENOUS at 11:40

## 2025-07-28 RX ADMIN — IOPAMIDOL 80 ML: 755 INJECTION, SOLUTION INTRAVENOUS at 15:10

## 2025-07-28 RX ADMIN — AMOXICILLIN 1 PACKAGE: 500 CAPSULE ORAL at 15:35

## 2025-07-28 RX ADMIN — Medication 1 PACKAGE: at 15:35

## 2025-07-28 NOTE — ED PROVIDER NOTES
HPI:  Chief Complaint   Patient presents with    Dizziness     Woke up with dizziness, nausea, and vomiting. States he hasn't taken his meds for 3 days       History of Present Illness  Biju Geiger is a 65 year old male with a history of TIA and myocardial infarction who presents with dizziness, nausea, and vomiting.    He has experienced dizziness, nausea, and vomiting since waking up this morning, despite feeling well the night before. No chest pain, shortness of breath, or headache. He was vomiting when the ambulance was called and does not recall if he was short of breath at that time.    He has a history of transient ischemic attack and a significant myocardial infarction in 2010. He has been on medications including omeprazole, metoprolol, aspirin, Eliquis, and amlodipine. Eliquis was started two weeks ago for unclear reasons. He has not taken his medications for the last three days, although he usually takes them in the morning and at night.    He smokes a pack and a half of cigarettes daily and occasionally smokes marijuana. He does not consume alcohol regularly. He mentions a previous similar illness while at a bike rally in [place], although he currently resides in [place]. He is staying in a camper with air conditioning at [facility] and arrived last Wednesday.  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  He did trailer his bike.    During the review of symptoms, no chest pain, headache, or current stomach upset. Normal urination and bowel movements without blood. He reports a cough, which he attributes to his smoking habit, but states it is not more than usual.    EMS reported that patient was very lethargic and hypoxic with SpO2 in the 80s upon their arrival.  They put him on 4 L to get him up to 92%.  Blood pressure was initially 98/72 but patient had received nitro from bystanders prior to EMS  arrival        HISTORY:  No past medical history on file.    No past surgical history on file.    No family history on file.      ROS:  Negative except as reviewed in HPI    PE:  ED Triage Vitals [07/28/25 1048]   Temp Heart Rate Resp BP SpO2   36.7 °C (98.1 °F) 61 24 108/63 92 %      Mean BP (mmHg) Temp src Heart Rate Source Patient Position BP Location   81 -- -- In bed Right arm      FiO2 (%)       --           Physical Exam  Vitals and nursing note reviewed.   Constitutional:       General: He is not in acute distress.     Appearance: He is well-developed.   Cardiovascular:      Rate and Rhythm: Regular rhythm. Bradycardia present.      Heart sounds: Normal heart sounds. No murmur heard.  Pulmonary:      Effort: Pulmonary effort is normal. No respiratory distress.      Breath sounds: Normal breath sounds.   Abdominal:      General: Bowel sounds are normal.      Palpations: Abdomen is soft.      Tenderness: There is no abdominal tenderness.   Musculoskeletal:         General: Normal range of motion.      Cervical back: Normal range of motion and neck supple.      Right lower leg: Edema present.      Left lower leg: Edema present.   Skin:     General: Skin is warm and dry.      Capillary Refill: Capillary refill takes less than 2 seconds.   Neurological:      Mental Status: He is alert and oriented to person, place, and time.         ED LABS:  Labs Reviewed   CBC WITH AUTO DIFFERENTIAL - Abnormal       Result Value    WBC 13.3 (*)     RBC 4.50      Hemoglobin 13.5      Hematocrit 41.1      MCV 91.3      MCH 30.0      MCHC 32.8      RDW-SD 48.2 (*)     RDW 14.3      Platelets 239      MPV 9.8      Neutrophils% 82.0 (*)     Lymphocytes% 8.9 (*)     Monocytes% 7.5      Eosinophils% 0.8      Basophils% 0.5      Immature Granulocyte% 0.3      ANC (auto diff) 10.91      Lymphocytes Absolute 1.18      Monocytes Absolute 0.99      Eosinophils Absolute 0.10      Basophils Absolute 0.06      Immature Granulocytes Absolute  "0.04      nRBC 0      nRBC Absolute 0.0     COMPREHENSIVE METABOLIC PANEL - Abnormal    Sodium 138      Potassium 3.9      Chloride 105      CO2 23      Anion Gap 10      BUN 12      Creatinine 0.88      Glucose 114 (*)     Calcium 8.0 (*)     AST 16      ALT (SGPT) 16      Alkaline Phosphatase 67      Total Protein 6.0      Albumin 3.5      Total Bilirubin 0.65      Corrected Calcium 8.4 (*)     eGFR 95      Narrative:     eGFR calculation based on the 2021 Chronic Kidney Disease Epidemiology Collaboration (CKD-EPI) equation refit without adjustment for race.   D-DIMER, QUANTITATIVE - Abnormal    D-Dimer, Quant (ng/mL) 653 (*)     Narrative:     D-dimer values increase with age and this can make VTE exclusion of an older population difficult. To address this, The American College of Phys[employer], based on best available evidence and recent guidelines, recommends that clinicians use age-adjusted D-dimer thresholds in patients greater than 50 years of age with: a) a low probability of PE who do not meet all Pulmonary Embolism Rule Out Criteria, or b) in those with intermediate probability of PE. The formula for an age-adjusted D-dimer cut-off is \"ageX10 ng/mL\". For example, a 60 year old patient would have an age-adjusted cut-off of 600 ng/mL FEU and an 80 year old would be 800 ng/mL FEU.  D-dimer values < or =500 ng/mL FEU may be used in conjunction with clinical pretest probability to exclude deep vein thrombosis (DVT) and pulmonary embolism (PE).   HIGH SENSITIVE TROPONIN I - Abnormal    hsTnI 0 Hour 31.2 (*)    HIGH SENSITIVE TROPONIN I, 1 HOUR - Abnormal    hsTnI 1 Hour 33.9 (*)     Delta from 0 Hour 2.7     URINE DRUG SCREEN, MEDICAL - Abnormal    Amphetamine Screen, Urine None Detected      Benzodiazepines Screen, Urine None Detected      Cannabinoid Screen, Urine Positive (*)     Cocaine Screen, Urine None Detected      Barbiturate Screen, Urine None Detected      Opiate Screen, Urine None Detected      PCP " Screen, Urine None Detected      Methadone Screen, Urine None Detected      Oxycodone Screen, Urine None Detected      TCA Screen, Urine None Detected      Methamphetamine Screen Urine None Detected      Narrative:     For medical diagnostic use only.     Positives are presumptive, confirmatory testing available upon request.    Detection Limits:   MERY 150 ng/mL   PCP 25 ng/mL    ng/mL   THC 50 ng/mL    ng/mL    ng/mL    ng/mL   mAMP 500 ng/mL    ng/mL    ng/mL    ng/mL      POCT GLUCOSE RESULTS ONLY - Abnormal    POC Glucose 137 (*)    TSH - Normal    TSH 2.964     B-TYPE NATRIURETIC PEPTIDE (BNP) - Normal    BNP 58     MAGNESIUM - Normal    Magnesium 1.8     PROTIME-INR - Normal    Protime 12.3      INR 1.1     PTT (ACTIVATED PARTIAL THROMBOPLASTIN TIME) - Normal    PTT 28.9     URINE FENTANYL SCREEN - Normal    URN FENTAYL INTERP Negative     ETHANOL - Normal    Ethanol Lvl <10      Narrative:     This blood alcohol is for medical use only.   80 mg/dL is legally intoxicated.      HIGH SENSITIVE TROPONIN I PANEL (0HR, 1HR, 2HR)    Narrative:     The following orders were created for panel order HS Troponin I Panel (0HR, 1HR, 2HR) Blood, Venous.  Procedure                               Abnormality         Status                     ---------                               -----------         ------                     HS Troponin I[[id number]]                Abnormal            Final result               1HR High Sensitive Trop I[[id number]]    Abnormal            Final result               2HR High Sensitive Tropo...[[id number]]                                                   Please view results for these tests on the individual orders.         ED IMAGES:  CT angiogram chest PE with IV contrast   Final Result   IMPRESSION:   1.  Respiratory motion artifact somewhat limits evaluation of the small segmental and subsegmental pulmonary arteries. No filling defects are  seen within the central pulmonary arteries to suggest pulmonary embolism, however.   2.  Small areas of airspace consolidation within the lingula and the bilateral lower lobes may represent atelectasis, however mild multifocal/atypical pneumonia is also possible.   3.  Bronchial wall thickening bilaterally, compatible with bronchitis.   4.  Few mildly enlarged mediastinal lymph nodes are most likely reactive, however this is nonspecific.   5.  Mild cardiomegaly.   6.  Severe coronary artery calcifications.   7.  15 mm right renal cortical hypodensity most likely represents a benign cyst or calyceal diverticulum, however this is indeterminate by density. Recommend nonemergent renal ultrasound as a first step in further evaluation.         CT angiogram head and neck STROKE ALERT   Final Result   IMPRESSION:   1.  No large vessel occlusion, aneurysm, dissection, or critical stenosis in the head or neck.   2.  Probable mild interstitial pulmonary edema.            XR chest portable 1 view   Final Result   IMPRESSION:   1.  Central vascular engorgement could be related to volume overload versus mild CHF. Artifact from portable technique is in the differential.   2.  Probable left basilar atelectasis.      CT head STROKE ALERT wo IV contrast   Final Result   IMPRESSION:   Moderate low attenuation white matter changes. There are no acute abnormalities.      Results Communication: findings concordant with the above were communicated to Dr. Perez by Fred Fine MD via telephone 7/28/2025 11:25 AM.             ED PROCEDURES:  ECG 12 lead    Date/Time: 7/28/2025 11:11 AM    Performed by: Maya Perez MD  Authorized by: Maya Perez MD    ECG reviewed by ED Physician in the absence of a cardiologist: yes    Previous ECG:     Previous ECG:  Unavailable  Comments:      Sinus arrhythmia, rate 66, right bundle branch block, minimal ST elevation in lead III, aVR, V1, V2.  Some ST depression in leads I, II and V4, unable to  compare Joni Salgado previous to see what his baseline is.      ED COURSE:  ED Course as of 07/28/25 2018 Mon Jul 28, 2025   1103 Differential is broad given acute change in mental status and patient holding his anticoagulation for the last day and a half.  Not clear on the details and with a history of TIA stroke has to be ruled out first.  I believe this is more likely cardiac or infectious related but his blood pressures are not reliable since he was given nitro by bystanders prior to arrival. [JM]   1127 Radiology states no hemorrhagic stroke and no obvious occlusive changes, will finalize but so far no evidence of stroke [JM]   1129 Home meds include Nexium, metoprolol twice daily, Eliquis twice daily and Crestor twice daily.  Looks like patient also takes an aspirin but hard to definitively identify. [JM]   1139 WBC is elevated at 13.3 but otherwise not meeting SIRS criteria [JM]   1140 Chest x-ray shows central vascular engorgement could be related to volume overload versus mild CHF with probable left basilar atelectasis [JM]   1146 Probable mild interstitial pulmonary edema, would be consistent with mild hypoxia [JM]   1204 D-dimer right at threshold for concerning number, his normal would be about 660 and the level is 653.  Cannot rule out PE but unable to do additional contrast since here to get the CTA head and neck. [JM]   1205 BNP 58 which is not consistent with evidence of fluid overload [JM]   1206 Troponin 31.2, will continue to trend; still no chest pain [JM]   1212 CMP unremarkable, magnesium normal [JM]   1303 Patient did receive 1 L of IV fluids from EMS, will BladderScan [JM]   1336 Troponin shows very little change.  Still quite lethargic. [JM]   1344 Patient has 409 mL in his bladder but states he cannot urinate.  Denies feeling any fullness or discomfort [JM]   1344 Still no chest pain.  With essentially stable troponins will stop trending.  Patient feels ready to leave but I explained that  our workup was not complete yet.  Hoping he will be able to urinate.  Most likely cause of his hypoxia at this time is some fluid overload but without a appreciable history of heart failure this is still early. [JM]   1424 Patient still unable to give us a urine sample, question if this is retention versus patient choice.  Explained that we had no other workup pending and besides a little bit of fluid on the lungs no specific findings.  Continues to sleep quite heavily which wife said was not his baseline.  Question whether or not there is anything on board that could be sedating him.  Doing well without supplemental oxygen [JM]   1519 CT PE protocol shows no filling defects within the central pulmonary arteries so unlikely to be PE, bronchitis and airspace consolidation making multifocal or atypical pneumonia possible, some mild cardiomegaly.  And severe coronary artery calcifications, 15 mm right renal cortical hypodensity likely representing a cyst, would need outpatient ultrasound after return home. [JM]      ED Course User Index  [JM] Maya Perez MD       MDM:  Medical Decision Making  Biju is a 65-year-old gentleman who came in lethargic, slightly altered, initially with nausea and vomiting and hypoxic.  Was unable to provide much history except for the fact that he had not taken his medications including Eliquis and has a history of TIAs and heart attack so differential was broad during workup.  CT head and CTA head and neck was negative for stroke, troponin and EKG unremarkable, patient had no chest pain.  D-dimer equivocal and unable to help rule out so we did a CT PE protocol which did show atypical pneumonia.  Patient was feeling much better after fluids and rest so he was started on amoxicillin and doxycycline and discharged from our emergency department with those in hand, normal O2 on room air.  Patient had no additional questions and was ready for discharge.  He was educated about the renal cyst  and need for follow-up in addition to his coronary artery calcifications.  I updated his wife twice by phone.  Patient was picked up by a friend and [place]        Final diagnoses:   [J18.9] Atypical pneumonia   [R09.02] Hypoxia     7/28/2025  3:23 PM                                        Maya Perez MD  07/28/25 2018

## 2025-07-28 NOTE — DISCHARGE INSTRUCTIONS
You had an extensive workup today showing no evidence of a stroke, no evidence of a heart attack, no evidence of a blood clot in the lungs. CT did show atypical pneumonia and we will treat with azithromycin and amoxicillin.    You also have severe coronary artery calcifications, you should talk with your primary care physician about this when you get home.  Another finding on CT was a cyst on your kidneys, would recommend a ultrasound when you get home.  Otherwise continue your medications, specifically your Eliquis.    To treat the atypical pneumonia I would like you to take amoxicillin 1 g or 2 tabs 3 times daily for 5 days, you also need to take azithromycin 2 tabs today followed by 1 tab daily for 4 days.  This will cover all of the different bacterial components that may be contributing to this pneumonia.  These prescriptions will be sent home with you from our in-house supply.    Your urine is still pending but the above antibiotics should cover any infection, if it does grow bacteria that is not covered we will call you with changes    If you develop worsening shortness of breath, chest pain or inability to tolerate your medications please return immediately for reevaluation.    Thank you for letting us care for you today.

## 2025-07-30 PROCEDURE — 93010 ELECTROCARDIOGRAM REPORT: CPT | Performed by: INTERNAL MEDICINE
